# Patient Record
Sex: MALE | Race: WHITE | NOT HISPANIC OR LATINO | Employment: FULL TIME | URBAN - METROPOLITAN AREA
[De-identification: names, ages, dates, MRNs, and addresses within clinical notes are randomized per-mention and may not be internally consistent; named-entity substitution may affect disease eponyms.]

---

## 2023-09-20 ENCOUNTER — APPOINTMENT (OUTPATIENT)
Dept: PRIMARY CARE | Facility: CLINIC | Age: 78
End: 2023-09-20
Payer: COMMERCIAL

## 2023-11-16 DIAGNOSIS — K21.9 GASTRO-ESOPHAGEAL REFLUX DISEASE WITHOUT ESOPHAGITIS: ICD-10-CM

## 2023-11-16 PROBLEM — E78.2 COMBINED HYPERLIPIDEMIA: Status: ACTIVE | Noted: 2023-11-16

## 2023-11-16 PROBLEM — R93.1 ELEVATED CORONARY ARTERY CALCIUM SCORE: Status: ACTIVE | Noted: 2023-11-16

## 2023-11-16 PROBLEM — R07.89 CHEST WALL PAIN, CHRONIC: Status: ACTIVE | Noted: 2023-11-16

## 2023-11-16 PROBLEM — T73.0XXA HUNGER PAIN: Status: ACTIVE | Noted: 2023-11-16

## 2023-11-16 PROBLEM — D18.01 HEMANGIOMA OF SKIN AND SUBCUTANEOUS TISSUE: Status: ACTIVE | Noted: 2019-01-23

## 2023-11-16 PROBLEM — R10.13 DYSPEPSIA: Status: RESOLVED | Noted: 2023-11-16 | Resolved: 2023-11-16

## 2023-11-16 PROBLEM — N52.9 ERECTILE DYSFUNCTION: Status: ACTIVE | Noted: 2023-11-16

## 2023-11-16 PROBLEM — M25.551 HIP PAIN, RIGHT: Status: ACTIVE | Noted: 2023-11-16

## 2023-11-16 PROBLEM — K31.89 SUBMUCOSAL LESION OF STOMACH: Status: ACTIVE | Noted: 2023-11-16

## 2023-11-16 PROBLEM — L25.9 CONTACT DERMATITIS: Status: RESOLVED | Noted: 2023-11-16 | Resolved: 2023-11-16

## 2023-11-16 PROBLEM — B96.89 ACUTE BACTERIAL SINUSITIS: Status: RESOLVED | Noted: 2023-11-16 | Resolved: 2023-11-16

## 2023-11-16 PROBLEM — N40.0 BPH (BENIGN PROSTATIC HYPERPLASIA): Status: ACTIVE | Noted: 2023-11-16

## 2023-11-16 PROBLEM — R22.42 MASS OF LEFT LOWER EXTREMITY: Status: ACTIVE | Noted: 2023-11-16

## 2023-11-16 PROBLEM — R10.9 ABDOMINAL PAIN: Status: RESOLVED | Noted: 2023-11-16 | Resolved: 2023-11-16

## 2023-11-16 PROBLEM — L98.9 SKIN LESION OF SCALP: Status: ACTIVE | Noted: 2023-11-16

## 2023-11-16 PROBLEM — K44.9 HIATAL HERNIA: Status: ACTIVE | Noted: 2023-11-16

## 2023-11-16 PROBLEM — N31.9 NEUROGENIC BLADDER: Status: ACTIVE | Noted: 2023-11-16

## 2023-11-16 PROBLEM — L57.0 ACTINIC KERATOSIS: Status: ACTIVE | Noted: 2019-01-23

## 2023-11-16 PROBLEM — G89.29 CHEST WALL PAIN, CHRONIC: Status: ACTIVE | Noted: 2023-11-16

## 2023-11-16 PROBLEM — N39.0 RECURRENT UTI: Status: ACTIVE | Noted: 2023-11-16

## 2023-11-16 PROBLEM — I25.10 ATHEROSCLEROTIC HEART DISEASE OF NATIVE CORONARY ARTERY WITHOUT ANGINA PECTORIS: Status: ACTIVE | Noted: 2023-11-16

## 2023-11-16 PROBLEM — K14.6 TONGUE PAIN: Status: RESOLVED | Noted: 2023-11-16 | Resolved: 2023-11-16

## 2023-11-16 PROBLEM — J30.2 SEASONAL ALLERGIC RHINITIS: Status: ACTIVE | Noted: 2022-09-20

## 2023-11-16 PROBLEM — J01.90 ACUTE BACTERIAL SINUSITIS: Status: RESOLVED | Noted: 2023-11-16 | Resolved: 2023-11-16

## 2023-11-16 PROBLEM — I65.29 CAROTID ARTERY STENOSIS: Status: ACTIVE | Noted: 2023-11-16

## 2023-11-16 RX ORDER — PIMECROLIMUS 10 MG/G
CREAM TOPICAL
COMMUNITY
Start: 2022-12-15

## 2023-11-16 RX ORDER — ASPIRIN 81 MG/1
1 TABLET ORAL DAILY
COMMUNITY
Start: 2019-10-17

## 2023-11-16 RX ORDER — OMEPRAZOLE 40 MG/1
40 CAPSULE, DELAYED RELEASE ORAL DAILY
Qty: 90 CAPSULE | Refills: 3 | Status: SHIPPED | OUTPATIENT
Start: 2023-11-16

## 2023-11-16 RX ORDER — NAPROXEN 500 MG/1
TABLET ORAL
COMMUNITY
Start: 2023-09-15

## 2023-11-16 RX ORDER — OMEPRAZOLE 40 MG/1
40 CAPSULE, DELAYED RELEASE ORAL DAILY
COMMUNITY

## 2023-11-16 RX ORDER — AZELASTINE 1 MG/ML
SPRAY, METERED NASAL
COMMUNITY

## 2023-11-16 RX ORDER — PREDNISOLONE ACETATE 10 MG/ML
SUSPENSION/ DROPS OPHTHALMIC
COMMUNITY
Start: 2023-08-26

## 2023-11-16 RX ORDER — ATORVASTATIN CALCIUM 40 MG/1
TABLET, FILM COATED ORAL
COMMUNITY

## 2023-11-16 RX ORDER — FLUTICASONE PROPIONATE 50 MCG
SPRAY, SUSPENSION (ML) NASAL
COMMUNITY

## 2024-10-26 ENCOUNTER — OFFICE VISIT (OUTPATIENT)
Dept: URGENT CARE | Age: 79
End: 2024-10-26
Payer: COMMERCIAL

## 2024-10-26 VITALS
OXYGEN SATURATION: 98 % | DIASTOLIC BLOOD PRESSURE: 85 MMHG | TEMPERATURE: 97.5 F | SYSTOLIC BLOOD PRESSURE: 139 MMHG | HEART RATE: 86 BPM | RESPIRATION RATE: 16 BRPM

## 2024-10-26 DIAGNOSIS — R07.89 CHEST DISCOMFORT: Primary | ICD-10-CM

## 2024-10-26 ASSESSMENT — ENCOUNTER SYMPTOMS
EYES NEGATIVE: 1
PALPITATIONS: 1
MUSCULOSKELETAL NEGATIVE: 1
GASTROINTESTINAL NEGATIVE: 1
RESPIRATORY NEGATIVE: 1
ACTIVITY CHANGE: 1

## 2024-10-26 NOTE — PROGRESS NOTES
"Subjective   Patient ID: Fadi North is a 79 y.o. male. They present today with a chief complaint of Chest Pain.    History of Present Illness  A 79-year-old male with past medical history of GERD, hyperlipidemia arrives to clinic with chief complaint of chest palpitations.  The patient reports that he has a history of \"ectopic heartbeats\" that has been ongoing for the last 15 years.  He states that they would come and go.  He reports that this time, he has had irregular heartbeat/patterns over the last 2 weeks that has not gone away.  He has not changed any ADLs, environmental stressors, or food intolerance he reports no changes in his ADLs, environmental stressors, or changes in medications.  He is here at the urgent care today as he does not want to wait in the emergency department.  He is here for further evaluation helping us.  Chest Pain  Associated symptoms: palpitations        Past Medical History  Allergies as of 10/26/2024 - Reviewed 10/26/2024   Allergen Reaction Noted    Penicillins Unknown 07/18/2018       (Not in a hospital admission)       Past Medical History:   Diagnosis Date    Acute bacterial sinusitis 11/16/2023    Acute frontal sinusitis, unspecified 01/08/2018    Acute frontal sinusitis    Acute serous otitis media, bilateral 08/07/2018    Bilateral acute serous otitis media, recurrence not specified    Acute upper respiratory infection, unspecified 01/11/2019    Acute upper respiratory infection    Candidiasis of skin and nail 12/03/2018    Candidal dermatitis    Contact dermatitis 11/16/2023    Dermatitis, 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    Dermatitis, eczematoid    Disorder of the skin and subcutaneous tissue, unspecified 12/31/2018    Skin lesion    Fracture of unspecified phalanx of unspecified finger, initial encounter for closed fracture 03/26/2013    Closed fracture of one or more phalanges of hand    Gastro-esophageal reflux disease without esophagitis 06/30/2013    Esophageal reflux    " 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    Glossodynia    Headache, 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    Headache    Headache, unspecified 01/15/2018    Frontal headache    Malignant neoplasm of tongue, 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    Nonspecific lymphadenitis, 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    Lymphadenitis    Other conditions influencing health status 03/26/2013    Foot pain, unspecified laterality    Other conditions influencing health status 01/11/2019    History of cough    Other conditions influencing health status 06/30/2013    Anomalies Of The Tongue    Other diseases of stomach and duodenum 04/15/2021    Submucosal lesion of stomach    Other specified hypothyroidism 03/26/2013    Secondary hypothyroidism    Pain in throat 06/02/2016    Throat pain    Personal history of diseases of the skin and subcutaneous tissue 06/11/2018    History of actinic keratosis    Personal history of diseases of the skin and subcutaneous tissue 12/17/2018    History of seborrheic keratosis    Personal history of other diseases of the respiratory system 08/07/2018    History of sore throat    Personal history of other specified conditions 06/11/2018    History of polyuria    Primary osteoarthritis, unspecified hand 03/26/2013    Osteoarthritis, hand    Tongue pain 11/16/2023    Urinary tract infection, site not specified 03/26/2013    Urinary tract infection       Past Surgical History:   Procedure Laterality Date    BLADDER SURGERY  09/03/2015    Bladder Surgery    OTHER SURGICAL HISTORY  09/03/2015    Biopsy Tongue            Review of Systems  Review of Systems   Constitutional:  Positive for activity change.   HENT: Negative.     Eyes: Negative.    Respiratory: Negative.     Cardiovascular:  Positive for chest pain and palpitations.   Gastrointestinal: Negative.    Genitourinary: Negative.    Musculoskeletal: Negative.      Objective    There were no vitals filed for this visit.  No LMP for male patient.    Physical Exam  Vitals and  nursing note reviewed.   Constitutional:       Appearance: Normal appearance.   HENT:      Head: Normocephalic and atraumatic.      Right Ear: Tympanic membrane normal.      Left Ear: Tympanic membrane normal.      Nose: Nose normal.      Mouth/Throat:      Mouth: Mucous membranes are moist.      Pharynx: Oropharynx is clear.   Eyes:      Extraocular Movements: Extraocular movements intact.      Conjunctiva/sclera: Conjunctivae normal.      Pupils: Pupils are equal, round, and reactive to light.   Cardiovascular:      Rate and Rhythm: Regular rhythm. Tachycardia present.   Pulmonary:      Effort: Pulmonary effort is normal.      Breath sounds: Normal breath sounds.   Abdominal:      General: Bowel sounds are normal.      Palpations: Abdomen is soft.   Musculoskeletal:         General: Normal range of motion.      Cervical back: Normal range of motion and neck supple.   Skin:     General: Skin is warm.      Capillary Refill: Capillary refill takes less than 2 seconds.   Neurological:      General: No focal deficit present.      Mental Status: He is alert and oriented to person, place, and time. Mental status is at baseline.   Psychiatric:         Mood and Affect: Mood normal.         Behavior: Behavior normal.         Thought Content: Thought content normal.         Judgment: Judgment normal.         Procedures    Point of Care Test & Imaging Results from this visit  No results found for this visit on 10/26/24.   No results found.    Diagnostic study results (if any) were reviewed by YAMILEX Goldberg.    Assessment/Plan   Allergies, medications, history, and pertinent labs/EKGs/Imaging reviewed by YAMILEX Goldberg.     Medical Decision Making  Upon initial assessment, the patient was sitting at the bedside chair in mild distress.  The patient does seem winded at this time.  Patient denies any chest pain however does feel an irregular heart palpitations    EKG:Reveals sinus rhythm with minor  inferior repolarization disturbances, consider ischemia, LV overload or a specific change.  Heart rate is 87 bpm no ST elevation or ectopy noted    Previous EKG completed in the year of 2019 revealed normal sinus rhythm. Echocardiogram/stress test revealed normal EKG, with no evidence of significant ischemia or scar.  Normal LV function, with normal imaging.  This was done the same year.    Given the patient's age, comorbidities, and symptoms, recommend immediate evaluation at the patient reports that he will drive to the closest emergency department for further evaluation and health maintenance.    This document was generated using the assistance of voice recognition software. If there are any errors of spelling, grammar, syntax, or meaning; please feel free to contact me directly for clarification.     Orders and Diagnoses  Diagnoses and all orders for this visit:  Chest discomfort  -     ECG 12 Lead      Medical Admin Record      Patient disposition: ED    Electronically signed by YAMILEX Goldberg  3:42 PM

## 2024-10-31 ENCOUNTER — OFFICE VISIT (OUTPATIENT)
Dept: PRIMARY CARE | Facility: CLINIC | Age: 79
End: 2024-10-31
Payer: COMMERCIAL

## 2024-10-31 VITALS
SYSTOLIC BLOOD PRESSURE: 138 MMHG | DIASTOLIC BLOOD PRESSURE: 79 MMHG | WEIGHT: 166.2 LBS | OXYGEN SATURATION: 95 % | HEART RATE: 84 BPM | TEMPERATURE: 97.3 F | BODY MASS INDEX: 27.66 KG/M2

## 2024-10-31 DIAGNOSIS — R00.2 HEART PALPITATIONS: Primary | ICD-10-CM

## 2024-10-31 DIAGNOSIS — I65.23 BILATERAL CAROTID ARTERY STENOSIS: ICD-10-CM

## 2024-10-31 PROBLEM — R07.89 CHEST WALL PAIN, CHRONIC: Status: RESOLVED | Noted: 2023-11-16 | Resolved: 2024-10-31

## 2024-10-31 PROBLEM — Z96.641 STATUS POST TOTAL HIP REPLACEMENT, RIGHT: Status: ACTIVE | Noted: 2021-08-24

## 2024-10-31 PROBLEM — M16.12 PRIMARY OSTEOARTHRITIS OF LEFT HIP: Status: ACTIVE | Noted: 2022-09-14

## 2024-10-31 PROBLEM — M25.551 HIP PAIN, RIGHT: Status: RESOLVED | Noted: 2023-11-16 | Resolved: 2024-10-31

## 2024-10-31 PROBLEM — G89.29 CHEST WALL PAIN, CHRONIC: Status: RESOLVED | Noted: 2023-11-16 | Resolved: 2024-10-31

## 2024-10-31 PROBLEM — L98.9 SKIN LESION OF SCALP: Status: RESOLVED | Noted: 2023-11-16 | Resolved: 2024-10-31

## 2024-10-31 PROBLEM — R22.42 MASS OF LEFT LOWER EXTREMITY: Status: RESOLVED | Noted: 2023-11-16 | Resolved: 2024-10-31

## 2024-10-31 PROBLEM — T73.0XXA HUNGER PAIN: Status: RESOLVED | Noted: 2023-11-16 | Resolved: 2024-10-31

## 2024-10-31 PROBLEM — M16.11 PRIMARY OSTEOARTHRITIS OF RIGHT HIP: Status: ACTIVE | Noted: 2020-01-03

## 2024-10-31 PROCEDURE — 1160F RVW MEDS BY RX/DR IN RCRD: CPT | Performed by: STUDENT IN AN ORGANIZED HEALTH CARE EDUCATION/TRAINING PROGRAM

## 2024-10-31 PROCEDURE — 1036F TOBACCO NON-USER: CPT | Performed by: STUDENT IN AN ORGANIZED HEALTH CARE EDUCATION/TRAINING PROGRAM

## 2024-10-31 PROCEDURE — 99214 OFFICE O/P EST MOD 30 MIN: CPT | Performed by: STUDENT IN AN ORGANIZED HEALTH CARE EDUCATION/TRAINING PROGRAM

## 2024-10-31 PROCEDURE — 1159F MED LIST DOCD IN RCRD: CPT | Performed by: STUDENT IN AN ORGANIZED HEALTH CARE EDUCATION/TRAINING PROGRAM

## 2024-10-31 RX ORDER — METOPROLOL SUCCINATE 25 MG/1
25 TABLET, EXTENDED RELEASE ORAL
COMMUNITY
Start: 2024-10-26 | End: 2024-10-31 | Stop reason: ALTCHOICE

## 2024-10-31 ASSESSMENT — ENCOUNTER SYMPTOMS
PALPITATIONS: 1
COUGH: 0
RHINORRHEA: 0
FEVER: 0
FATIGUE: 0
CHILLS: 0
LIGHT-HEADEDNESS: 0
SHORTNESS OF BREATH: 1
DIZZINESS: 0
HEADACHES: 0

## 2024-11-18 ENCOUNTER — HOSPITAL ENCOUNTER (OUTPATIENT)
Dept: VASCULAR MEDICINE | Facility: CLINIC | Age: 79
Discharge: HOME | End: 2024-11-18
Payer: COMMERCIAL

## 2024-11-18 DIAGNOSIS — I65.23 BILATERAL CAROTID ARTERY STENOSIS: ICD-10-CM

## 2024-11-18 DIAGNOSIS — R09.89 OTHER SPECIFIED SYMPTOMS AND SIGNS INVOLVING THE CIRCULATORY AND RESPIRATORY SYSTEMS: ICD-10-CM

## 2024-11-18 PROCEDURE — 93880 EXTRACRANIAL BILAT STUDY: CPT | Performed by: STUDENT IN AN ORGANIZED HEALTH CARE EDUCATION/TRAINING PROGRAM

## 2024-11-18 PROCEDURE — 93880 EXTRACRANIAL BILAT STUDY: CPT

## 2024-11-20 ENCOUNTER — OFFICE VISIT (OUTPATIENT)
Dept: CARDIOLOGY | Facility: CLINIC | Age: 79
End: 2024-11-20
Payer: COMMERCIAL

## 2024-11-20 ENCOUNTER — HOSPITAL ENCOUNTER (OUTPATIENT)
Dept: CARDIOLOGY | Facility: CLINIC | Age: 79
Discharge: HOME | End: 2024-11-20
Payer: COMMERCIAL

## 2024-11-20 VITALS
WEIGHT: 167 LBS | HEART RATE: 71 BPM | HEIGHT: 66 IN | BODY MASS INDEX: 26.84 KG/M2 | DIASTOLIC BLOOD PRESSURE: 90 MMHG | SYSTOLIC BLOOD PRESSURE: 167 MMHG | TEMPERATURE: 97.3 F

## 2024-11-20 DIAGNOSIS — R07.89 CHEST DISCOMFORT: ICD-10-CM

## 2024-11-20 DIAGNOSIS — I65.21 MORE THAN 50 PERCENT STENOSIS OF RIGHT INTERNAL CAROTID ARTERY: Primary | ICD-10-CM

## 2024-11-20 DIAGNOSIS — E78.00 HYPERCHOLESTEREMIA: ICD-10-CM

## 2024-11-20 DIAGNOSIS — I65.21 MORE THAN 50 PERCENT STENOSIS OF RIGHT INTERNAL CAROTID ARTERY: ICD-10-CM

## 2024-11-20 DIAGNOSIS — I25.10 ATHEROSCLEROSIS OF NATIVE CORONARY ARTERY OF NATIVE HEART WITHOUT ANGINA PECTORIS: ICD-10-CM

## 2024-11-20 DIAGNOSIS — R00.2 HEART PALPITATIONS: ICD-10-CM

## 2024-11-20 DIAGNOSIS — R93.1 ELEVATED CORONARY ARTERY CALCIUM SCORE: ICD-10-CM

## 2024-11-20 DIAGNOSIS — R00.2 PALPITATIONS: ICD-10-CM

## 2024-11-20 LAB — BODY SURFACE AREA: 1.88 M2

## 2024-11-20 PROCEDURE — 1160F RVW MEDS BY RX/DR IN RCRD: CPT | Performed by: INTERNAL MEDICINE

## 2024-11-20 PROCEDURE — 1159F MED LIST DOCD IN RCRD: CPT | Performed by: INTERNAL MEDICINE

## 2024-11-20 PROCEDURE — 93242 EXT ECG>48HR<7D RECORDING: CPT

## 2024-11-20 PROCEDURE — 99215 OFFICE O/P EST HI 40 MIN: CPT | Performed by: INTERNAL MEDICINE

## 2024-11-20 PROCEDURE — 1036F TOBACCO NON-USER: CPT | Performed by: INTERNAL MEDICINE

## 2024-11-20 PROCEDURE — 99205 OFFICE O/P NEW HI 60 MIN: CPT | Performed by: INTERNAL MEDICINE

## 2024-11-20 RX ORDER — ASPIRIN 81 MG/1
81 TABLET ORAL DAILY
COMMUNITY

## 2024-11-20 RX ORDER — ROSUVASTATIN CALCIUM 20 MG/1
20 TABLET, COATED ORAL DAILY
Qty: 30 TABLET | Refills: 11 | Status: SHIPPED | OUTPATIENT
Start: 2024-11-20 | End: 2025-11-20

## 2024-11-20 NOTE — PROGRESS NOTES
Chief Complaint:   Coronary Artery Disease     History of Present Illness     Fadi North is a 79 y.o. male presenting with for evaluation of preclinical coronary artery disease detected by coronary artery calcium scoring (JHZ=335 in 2019).   The patient is tolerating guideline-directed medical therapy with antiplatelet and statin medication and is compliant.  The patient exercises regularly and follows a heart healthy diet.  The patient has been well since their last office appointment and is not having any anginal symptoms or dyspnea on exertion.  Chronic palpitations due to ectopic beats x decades more frequent and longer duration x 1 month. Seen in ER 10/26/24 - PVC's.  No syncope.  No neurologic Sx's.    Review of Systems  All pertinent systems have been reviewed and are negative except for what is stated in the history of present illness.    All other systems have been reviewed and are negative and noncontributory to this patient's current ailments.  .       Previous History     Past Medical History:  He has a past medical history of Acute bacterial sinusitis (11/16/2023), Acute frontal sinusitis, unspecified (01/08/2018), Acute serous otitis media, bilateral (08/07/2018), Acute upper respiratory infection, unspecified (01/11/2019), Candidiasis of skin and nail (12/03/2018), Chest discomfort (11/20/2024), Contact dermatitis (11/16/2023), Dermatitis, unspecified (03/26/2013), Disorder of the skin and subcutaneous tissue, unspecified (12/31/2018), Fracture of unspecified phalanx of unspecified finger, initial encounter for closed fracture (03/26/2013), Gastro-esophageal reflux disease without esophagitis (06/30/2013), Glossodynia (06/30/2013), Headache, unspecified (03/26/2013), Headache, unspecified (01/15/2018), Hypercholesteremia (11/20/2024), Malignant neoplasm of tongue, unspecified (03/26/2013), More than 50 percent stenosis of right internal carotid artery (11/20/2024), Nonspecific lymphadenitis,  unspecified (03/26/2013), Other conditions influencing health status (03/26/2013), Other conditions influencing health status (01/11/2019), Other conditions influencing health status (06/30/2013), Other diseases of stomach and duodenum (04/15/2021), Other specified hypothyroidism (03/26/2013), Pain in throat (06/02/2016), Palpitations (11/20/2024), Personal history of diseases of the skin and subcutaneous tissue (06/11/2018), Personal history of diseases of the skin and subcutaneous tissue (12/17/2018), Personal history of other diseases of the respiratory system (08/07/2018), Personal history of other specified conditions (06/11/2018), Primary osteoarthritis, unspecified hand (03/26/2013), Tongue pain (11/16/2023), and Urinary tract infection, site not specified (03/26/2013).    Past Surgical History:  He has a past surgical history that includes Other surgical history (09/03/2015); Bladder surgery (09/03/2015); and Total hip arthroplasty.      Social History:  He reports that he has never smoked. He has never been exposed to tobacco smoke. He has never used smokeless tobacco. No history on file for alcohol use and drug use.    Family History:  Family History   Problem Relation Name Age of Onset    No Known Problems Mother      Colon cancer Father      Other (COLORECTAL CANCER) Father      Breast cancer Sister      Genetic Disease Carrier Sister      Other (MONOALLELIC MUTATION OF DENY GENE) Sister      Breast cancer Father's Sister          Allergies:  Penicillins    Outpatient Medications:  Current Outpatient Medications   Medication Instructions    azelastine (Astelin) 137 mcg (0.1 %) nasal spray SPRAY 2 SPRAYS BY INTRANASAL ROUTE TWICE A DAY    fluticasone (Flonase) 50 mcg/actuation nasal spray INSTILL 2 SPRAYS BY INTRANASAL ROUTE EVERY DAY    omeprazole (PRILOSEC) 40 mg, Daily       Physical Examination   Vitals:  Visit Vitals  /90 (BP Location: Right arm)   Pulse 71   Temp 36.3 °C (97.3 °F)   Ht 1.676 m  "(5' 6\")   Wt 75.8 kg (167 lb)   BMI 26.95 kg/m²   Smoking Status Never   BSA 1.88 m²    Physical Exam  Vitals reviewed.   Constitutional:       General: He is not in acute distress.     Appearance: Normal appearance.   HENT:      Head: Normocephalic and atraumatic.      Nose: Nose normal.   Eyes:      Conjunctiva/sclera: Conjunctivae normal.   Cardiovascular:      Rate and Rhythm: Normal rate and regular rhythm.      Pulses: Normal pulses.      Heart sounds: No murmur heard.  Pulmonary:      Effort: Pulmonary effort is normal. No respiratory distress.      Breath sounds: Normal breath sounds. No wheezing, rhonchi or rales.   Abdominal:      General: Bowel sounds are normal. There is no distension.      Palpations: Abdomen is soft.      Tenderness: There is no abdominal tenderness.   Musculoskeletal:         General: No swelling.      Right lower leg: No edema.      Left lower leg: No edema.   Skin:     General: Skin is warm and dry.      Capillary Refill: Capillary refill takes less than 2 seconds.   Neurological:      General: No focal deficit present.      Mental Status: He is alert.   Psychiatric:         Mood and Affect: Mood normal.             Labs/Imaging/Cardiac Studies     Last Labs:  CBC -  No results found for: \"WBC\", \"HGB\", \"HCT\", \"MCV\", \"PLT\"    CMP -  Lab Results   Component Value Date    CALCIUM 10.2 06/11/2018    PHOS 3.7 06/11/2018    PROT 7.4 06/11/2018    ALBUMIN 4.6 06/11/2018    AST 18 06/11/2018    ALT 17 06/11/2018    ALKPHOS 77 06/11/2018    BILITOT 0.7 06/11/2018       LIPID PANEL -   Lab Results   Component Value Date    CHOL 241 (H) 10/17/2019    HDL 53.9 10/17/2019    CHHDL 4.5 10/17/2019    VLDL 20 10/17/2019    TRIG 100 10/17/2019       RENAL FUNCTION PANEL -   Lab Results   Component Value Date    K 4.7 06/11/2018    PHOS 3.7 06/11/2018       Lab Results   Component Value Date    HGBA1C 5.1 05/02/2022       Reviewed ECG  Reviewed Labs  Reviewed Carotid ultrasound  Reviewed Urgent care " records and ED records  Complex MDM/High risk condition severe carotid stenosis  Urgent referral    Echo:  No echocardiogram results found for the past 12 months       Assessment and Recommendations     Assessment/Plan     1. Heart palpitations  Long Hx ectopic beats.  Holter x 7 days. Rule out AF.  - Referral to Cardiology    2. More than 50 percent stenosis of right internal carotid artery (Primary)  Start ASA and Crestor and referral to vascular surgery. ASX.    3. Hypercholesteremia  Star Crestor goal LDL<70    4. Elevated coronary artery calcium score    5. Atherosclerosis of native coronary artery of native heart without angina pectoris  The patient's CAD, as detailed in the HPI, has been clinically stable, without any anginal symptoms or dyspnea.  The patient will start treatment with guideline-directed medical therapy with antiplatelet (ASA) and statin medications and was advised regular exercise and a heart healthy diet.               Jose Vang MD    Exclusive of any other services or procedures performed, I, Jose Vang MD , spent 30 minutes in duration for this visit today.  This time consisted of chart review, obtaining history, and/or performing the exam as documented above as well as documenting the clinical information for the encounter in the electronic record, discussing treatment options, plans, and/or goals with patient, family, and/or caregiver, refilling medications, updating the electronic record, ordering medicines, lab work, imaging, referrals, and/or procedures as documented above and communicating with other [facility] professionals. I have discussed the results of laboratory, radiology, and cardiology studies with the patient and their family/caregiver.

## 2024-11-25 ENCOUNTER — TELEPHONE (OUTPATIENT)
Dept: PRIMARY CARE | Facility: CLINIC | Age: 79
End: 2024-11-25
Payer: COMMERCIAL

## 2024-11-25 DIAGNOSIS — R32 URINARY INCONTINENCE, UNSPECIFIED TYPE: Primary | ICD-10-CM

## 2024-11-27 NOTE — TELEPHONE ENCOUNTER
Has had a urologist for past 16 years. Has bladder atrophy and has to be cath 4 to 5 times a day. Looking for a new Urologist that might be more helpful. Has recurrent UTI's because of catheter.

## 2024-12-10 ENCOUNTER — TELEPHONE (OUTPATIENT)
Dept: CARDIOLOGY | Facility: CLINIC | Age: 79
End: 2024-12-10

## 2024-12-10 LAB — BODY SURFACE AREA: 1.88 M2

## 2024-12-10 NOTE — TELEPHONE ENCOUNTER
----- Message from Jose Vang sent at 12/10/2024  2:25 PM EST -----  Holter unremarkable - rare brief SVT and rare PAC's.  ----- Message -----  From: Jose Vang MD  Sent: 12/10/2024  12:42 PM EST  To: Jose Vang MD

## 2024-12-18 ENCOUNTER — TELEPHONE (OUTPATIENT)
Dept: PRIMARY CARE | Facility: CLINIC | Age: 79
End: 2024-12-18
Payer: COMMERCIAL

## 2024-12-18 DIAGNOSIS — K21.9 GASTROESOPHAGEAL REFLUX DISEASE WITHOUT ESOPHAGITIS: ICD-10-CM

## 2024-12-18 RX ORDER — OMEPRAZOLE 40 MG/1
40 CAPSULE, DELAYED RELEASE ORAL DAILY
Qty: 90 CAPSULE | Refills: 1 | Status: SHIPPED | OUTPATIENT
Start: 2024-12-18 | End: 2025-06-16

## 2024-12-18 NOTE — TELEPHONE ENCOUNTER
Patient called, LANDON, requesting refill of Omeprazole be sent to [facility] in [place].     LOV - 10/31/24

## 2024-12-23 ENCOUNTER — OFFICE VISIT (OUTPATIENT)
Dept: VASCULAR SURGERY | Facility: HOSPITAL | Age: 79
End: 2024-12-23
Payer: COMMERCIAL

## 2024-12-23 VITALS
BODY MASS INDEX: 27.16 KG/M2 | HEIGHT: 66 IN | HEART RATE: 74 BPM | DIASTOLIC BLOOD PRESSURE: 90 MMHG | WEIGHT: 169 LBS | OXYGEN SATURATION: 98 % | SYSTOLIC BLOOD PRESSURE: 157 MMHG

## 2024-12-23 DIAGNOSIS — E78.00 HYPERCHOLESTEREMIA: ICD-10-CM

## 2024-12-23 DIAGNOSIS — I65.21 MORE THAN 50 PERCENT STENOSIS OF RIGHT INTERNAL CAROTID ARTERY: ICD-10-CM

## 2024-12-23 DIAGNOSIS — I25.10 ATHEROSCLEROSIS OF NATIVE CORONARY ARTERY OF NATIVE HEART WITHOUT ANGINA PECTORIS: ICD-10-CM

## 2024-12-23 DIAGNOSIS — I65.23 CAROTID STENOSIS, ASYMPTOMATIC, BILATERAL: Primary | ICD-10-CM

## 2024-12-23 DIAGNOSIS — R93.1 ELEVATED CORONARY ARTERY CALCIUM SCORE: ICD-10-CM

## 2024-12-23 DIAGNOSIS — R00.2 PALPITATIONS: ICD-10-CM

## 2024-12-23 DIAGNOSIS — R00.2 HEART PALPITATIONS: ICD-10-CM

## 2024-12-23 DIAGNOSIS — R07.89 CHEST DISCOMFORT: ICD-10-CM

## 2024-12-23 PROCEDURE — 99204 OFFICE O/P NEW MOD 45 MIN: CPT | Performed by: SURGERY

## 2024-12-23 PROCEDURE — 99214 OFFICE O/P EST MOD 30 MIN: CPT | Performed by: SURGERY

## 2024-12-23 ASSESSMENT — ENCOUNTER SYMPTOMS
BACK PAIN: 0
WOUND: 0
ABDOMINAL PAIN: 0
DIZZINESS: 0
APNEA: 0

## 2024-12-23 NOTE — PROGRESS NOTES
Vascular Surgery Consult/Clinic Note    CC: Carotid stenosis.     HPI:  Fadi North is 79 y.o. male with history of CAD who is here for for Asx. Carotid stenosis.   He denies any history of stroke, TIA or unilateral neurologic symptoms.       Meds:   Current Outpatient Medications on File Prior to Visit   Medication Sig Dispense Refill    aspirin 81 mg EC tablet Take 1 tablet (81 mg) by mouth once daily.      azelastine (Astelin) 137 mcg (0.1 %) nasal spray SPRAY 2 SPRAYS BY INTRANASAL ROUTE TWICE A DAY      fluticasone (Flonase) 50 mcg/actuation nasal spray INSTILL 2 SPRAYS BY INTRANASAL ROUTE EVERY DAY      omeprazole (PriLOSEC) 40 mg DR capsule Take 1 capsule (40 mg) by mouth once daily. 90 capsule 1    rosuvastatin (Crestor) 20 mg tablet Take 1 tablet (20 mg) by mouth once daily. 30 tablet 11    [DISCONTINUED] omeprazole (PriLOSEC) 40 mg DR capsule Take 1 capsule (40 mg) by mouth once daily.       No current facility-administered medications on file prior to visit.        Allergies:   Allergies   Allergen Reactions    Penicillins Unknown       SH:    Social Drivers of Health     Tobacco Use: Low Risk  (11/20/2024)    Patient History     Smoking Tobacco Use: Never     Smokeless Tobacco Use: Never     Passive Exposure: Never   Alcohol Use: Not on file   Financial Resource Strain: Not on file   Food Insecurity: Not on file   Transportation Needs: Not on file   Physical Activity: Not on file   Stress: Not on file   Social Connections: Not on file   Intimate Partner Violence: Not on file   Depression: Not on file   Housing Stability: Not on file   Utilities: Not on file   Digital Equity: Not on file   Health Literacy: Not on file        FH:  Family History   Problem Relation Name Age of Onset    No Known Problems Mother      Colon cancer Father      Other (COLORECTAL CANCER) Father      Breast cancer Sister      Genetic Disease Carrier Sister      Other (MONOALLELIC MUTATION OF DENY GENE) Sister      Breast  cancer Father's Sister          ROS:  Review of Systems   HENT:  Negative for congestion.    Respiratory:  Negative for apnea.    Cardiovascular:  Negative for chest pain.   Gastrointestinal:  Negative for abdominal pain.   Musculoskeletal:  Negative for back pain.   Skin:  Negative for wound.   Neurological:  Negative for dizziness.        Objective:  Vitals:  Vitals:    12/23/24 0813   BP: 157/90   Pulse:    SpO2:         Exam:  Gen: in NAD  GI: Soft, ND/NT  Ext:  BUE/BLE with 5/5 motor str.   CN II-XII grossly intact.     Imaging:  Carotid artery duplex (11/18/2024) independently reviewed.   R ICA with >70% stenosis.   L ICA patent without flow limiting stenosis.       Assessment & Plan:  Fadi North is 79 y.o. male with Asx. R ICA >70% stenosis.    - Cont. ASA and statin therapy.    - Follow up in 2-4 weeks with CTA head/neck.       Burton Beltran MD, PhD  Clinical   [facility] [employer]  Co-Director, Aortic Center  [facility] [place]

## 2025-01-03 ENCOUNTER — LAB (OUTPATIENT)
Dept: LAB | Facility: LAB | Age: 80
End: 2025-01-03
Payer: COMMERCIAL

## 2025-01-03 DIAGNOSIS — I65.23 CAROTID STENOSIS, ASYMPTOMATIC, BILATERAL: ICD-10-CM

## 2025-01-03 DIAGNOSIS — I65.23 BILATERAL CAROTID ARTERY STENOSIS: ICD-10-CM

## 2025-01-03 DIAGNOSIS — R00.2 HEART PALPITATIONS: ICD-10-CM

## 2025-01-03 LAB
ALBUMIN SERPL BCP-MCNC: 4.3 G/DL (ref 3.4–5)
ALP SERPL-CCNC: 66 U/L (ref 33–136)
ALT SERPL W P-5'-P-CCNC: 13 U/L (ref 10–52)
ANION GAP SERPL CALC-SCNC: 9 MMOL/L (ref 10–20)
AST SERPL W P-5'-P-CCNC: 17 U/L (ref 9–39)
BILIRUB SERPL-MCNC: 0.7 MG/DL (ref 0–1.2)
BUN SERPL-MCNC: 19 MG/DL (ref 6–23)
CALCIUM SERPL-MCNC: 9.6 MG/DL (ref 8.6–10.3)
CHLORIDE SERPL-SCNC: 106 MMOL/L (ref 98–107)
CHOLEST SERPL-MCNC: 260 MG/DL (ref 0–199)
CHOLESTEROL/HDL RATIO: 4.3
CO2 SERPL-SCNC: 29 MMOL/L (ref 21–32)
CREAT SERPL-MCNC: 1.19 MG/DL (ref 0.5–1.3)
EGFRCR SERPLBLD CKD-EPI 2021: 63 ML/MIN/1.73M*2
ERYTHROCYTE [DISTWIDTH] IN BLOOD BY AUTOMATED COUNT: 13.5 % (ref 11.5–14.5)
EST. AVERAGE GLUCOSE BLD GHB EST-MCNC: 94 MG/DL
GLUCOSE SERPL-MCNC: 99 MG/DL (ref 74–99)
HBA1C MFR BLD: 4.9 %
HCT VFR BLD AUTO: 46.3 % (ref 41–52)
HDLC SERPL-MCNC: 60.7 MG/DL
HGB BLD-MCNC: 15.1 G/DL (ref 13.5–17.5)
LDLC SERPL CALC-MCNC: 168 MG/DL
MCH RBC QN AUTO: 32.7 PG (ref 26–34)
MCHC RBC AUTO-ENTMCNC: 32.6 G/DL (ref 32–36)
MCV RBC AUTO: 100 FL (ref 80–100)
NON HDL CHOLESTEROL: 199 MG/DL (ref 0–149)
NRBC BLD-RTO: 0 /100 WBCS (ref 0–0)
PLATELET # BLD AUTO: 255 X10*3/UL (ref 150–450)
POTASSIUM SERPL-SCNC: 4.2 MMOL/L (ref 3.5–5.3)
PROT SERPL-MCNC: 6.8 G/DL (ref 6.4–8.2)
RBC # BLD AUTO: 4.62 X10*6/UL (ref 4.5–5.9)
SODIUM SERPL-SCNC: 140 MMOL/L (ref 136–145)
TRIGL SERPL-MCNC: 157 MG/DL (ref 0–149)
VLDL: 31 MG/DL (ref 0–40)
WBC # BLD AUTO: 6.8 X10*3/UL (ref 4.4–11.3)

## 2025-01-03 PROCEDURE — 80053 COMPREHEN METABOLIC PANEL: CPT

## 2025-01-03 PROCEDURE — 83036 HEMOGLOBIN GLYCOSYLATED A1C: CPT

## 2025-01-03 PROCEDURE — 80061 LIPID PANEL: CPT

## 2025-01-03 PROCEDURE — 85027 COMPLETE CBC AUTOMATED: CPT

## 2025-01-07 ENCOUNTER — HOSPITAL ENCOUNTER (OUTPATIENT)
Dept: RADIOLOGY | Facility: CLINIC | Age: 80
Discharge: HOME | End: 2025-01-07
Payer: COMMERCIAL

## 2025-01-07 DIAGNOSIS — I65.23 CAROTID STENOSIS, ASYMPTOMATIC, BILATERAL: ICD-10-CM

## 2025-01-07 PROCEDURE — 2550000001 HC RX 255 CONTRASTS: Performed by: SURGERY

## 2025-01-07 PROCEDURE — 70496 CT ANGIOGRAPHY HEAD: CPT | Performed by: RADIOLOGY

## 2025-01-07 PROCEDURE — 70498 CT ANGIOGRAPHY NECK: CPT

## 2025-01-07 PROCEDURE — 70498 CT ANGIOGRAPHY NECK: CPT | Performed by: RADIOLOGY

## 2025-01-07 RX ADMIN — IOHEXOL 79 ML: 350 INJECTION, SOLUTION INTRAVENOUS at 10:07

## 2025-01-15 ENCOUNTER — APPOINTMENT (OUTPATIENT)
Dept: UROLOGY | Facility: HOSPITAL | Age: 80
End: 2025-01-15
Payer: COMMERCIAL

## 2025-01-20 ENCOUNTER — OFFICE VISIT (OUTPATIENT)
Dept: VASCULAR SURGERY | Facility: HOSPITAL | Age: 80
End: 2025-01-20
Payer: COMMERCIAL

## 2025-01-20 VITALS
SYSTOLIC BLOOD PRESSURE: 162 MMHG | BODY MASS INDEX: 26.52 KG/M2 | HEART RATE: 86 BPM | OXYGEN SATURATION: 96 % | HEIGHT: 66 IN | WEIGHT: 165 LBS | DIASTOLIC BLOOD PRESSURE: 93 MMHG

## 2025-01-20 DIAGNOSIS — I65.21 CAROTID STENOSIS, ASYMPTOMATIC, RIGHT: Primary | ICD-10-CM

## 2025-01-20 PROCEDURE — 99214 OFFICE O/P EST MOD 30 MIN: CPT | Mod: 57 | Performed by: SURGERY

## 2025-01-20 PROCEDURE — 99214 OFFICE O/P EST MOD 30 MIN: CPT | Performed by: SURGERY

## 2025-01-20 PROCEDURE — 1159F MED LIST DOCD IN RCRD: CPT | Performed by: SURGERY

## 2025-01-20 ASSESSMENT — ENCOUNTER SYMPTOMS
OCCASIONAL FEELINGS OF UNSTEADINESS: 0
LOSS OF SENSATION IN FEET: 0
DEPRESSION: 0

## 2025-01-20 NOTE — H&P (VIEW-ONLY)
Vascular Surgery Clinic Note    CC: symptomatic carotid artery stenosis    History Of Present Illness:   Fadi North is a 79 y.o. male with asymptomatic carotid artery stenosis. Duplex US from 11/18/24 demonstrated >70% right ICA stenosis, <50% left ICA stenosis. CTA head and neck from 1/7/25 with focal 70-80% right ICA stenosis, 15-20% left carotid stenosis.  Since last seen in clinic on 12/23/24, patient reports no issues. Denies history of stroke/TIA symptoms. Denies any recent hospitalizations or change in medications. He takes his ASA and statin as prescribed.    Medical History:  Patient Active Problem List   Diagnosis    Atherosclerotic heart disease of native coronary artery without angina pectoris    BPH (benign prostatic hyperplasia)    Carotid artery stenosis    Elevated coronary artery calcium score    Erectile dysfunction    Gastroesophageal reflux disease    Hiatal hernia    Submucosal lesion of stomach    Hemangioma of skin and subcutaneous tissue    Combined hyperlipidemia    Neurogenic bladder    Recurrent UTI    Seasonal allergic rhinitis    Actinic keratosis    Primary osteoarthritis of left hip    Primary osteoarthritis of right hip    Status post total hip replacement, right    More than 50 percent stenosis of right internal carotid artery    Hypercholesteremia    Palpitations        SH:    Social Drivers of Health     Tobacco Use: Low Risk  (11/20/2024)    Patient History     Smoking Tobacco Use: Never     Smokeless Tobacco Use: Never     Passive Exposure: Never   Alcohol Use: Not on file   Financial Resource Strain: Not on file   Food Insecurity: Not on file   Transportation Needs: Not on file   Physical Activity: Not on file   Stress: Not on file   Social Connections: Not on file   Intimate Partner Violence: Not on file   Depression: Not on file   Housing Stability: Not on file   Utilities: Not on file   Digital Equity: Not on file   Health Literacy: Not on file        FH:  Family  "History   Problem Relation Name Age of Onset    No Known Problems Mother      Colon cancer Father      Other (COLORECTAL CANCER) Father      Breast cancer Sister      Genetic Disease Carrier Sister      Other (MONOALLELIC MUTATION OF DENY GENE) Sister      Breast cancer Father's Sister          Allergies:   Allergies   Allergen Reactions    Penicillins Unknown       Meds:   Current Outpatient Medications   Medication Instructions    aspirin 81 mg, Daily    azelastine (Astelin) 137 mcg (0.1 %) nasal spray SPRAY 2 SPRAYS BY INTRANASAL ROUTE TWICE A DAY    fluticasone (Flonase) 50 mcg/actuation nasal spray INSTILL 2 SPRAYS BY INTRANASAL ROUTE EVERY DAY    omeprazole (PRILOSEC) 40 mg, oral, Daily    rosuvastatin (CRESTOR) 20 mg, oral, Daily       ROS:  All systems were reviewed and noted to be negative, other than described above.     Objective:  Last Recorded Vitals  Blood pressure (!) 162/93, pulse 86, height 1.676 m (5' 6\"), weight 74.8 kg (165 lb), SpO2 96%.    Exam:  Constitutional: Well appearing, NAD.  CARD: No tachycardia, RRR.  RESP: Unlabored breathing.  ABD: Soft, nontender, non-distended.  SKIN: No lesions.  NEURO: No focal deficits noted. Motor/sensory intact.   EXTREMITIES: Warm & well perfused.   PULSES: Bilateral palpable radial and carotid pulses    Imaging Reviewed:  CTA head and neck 1/7/25 - independently reviewed  IMPRESSION:  CTA head:      Mild focal narrowing involving the P2 segments of the bilateral  posterior cerebral arteries, otherwise no striking narrowing of the  visualized arteries in the head.      CTA neck:      1. There is 75-80% luminal narrowing of the right carotid bulb and  15-20% luminal narrowing of the left carotid bulb, according to  NASCET criteria. Luminal narrowing on the right is primarily due to  noncalcified plaque with some contribution from atherosclerotic  calcifications.      2. Otherwise, no striking narrowing of the visualized arteries in the  neck.      This study " was interpreted at [facility].        Assessment & Plan:  Fadi North is a 79 y.o. male with asymptomatic >70% right carotid artery stenosis. Has recent carotid duplex and CTA head and neck demonstrating 70-80% right ICA stenosis, 15-20% left ICA stenosis. Patient meets criteria for right CEA.    - plan for right CEA, possibly on 1/30/25 pending OR availability  - risks, benefits, and alternative discussed and informed consent obtained in office  - continue ASA and statin therapy    Patient seen and examined with Dr. Ruby Rowe MD  Vascular Surgery PGY6

## 2025-01-20 NOTE — PROGRESS NOTES
Vascular Surgery Clinic Note    CC: symptomatic carotid artery stenosis    History Of Present Illness:   Fadi North is a 79 y.o. male with asymptomatic carotid artery stenosis. Duplex US from 11/18/24 demonstrated >70% right ICA stenosis, <50% left ICA stenosis. CTA head and neck from 1/7/25 with focal 70-80% right ICA stenosis, 15-20% left carotid stenosis.  Since last seen in clinic on 12/23/24, patient reports no issues. Denies history of stroke/TIA symptoms. Denies any recent hospitalizations or change in medications. He takes his ASA and statin as prescribed.    Medical History:  Patient Active Problem List   Diagnosis    Atherosclerotic heart disease of native coronary artery without angina pectoris    BPH (benign prostatic hyperplasia)    Carotid artery stenosis    Elevated coronary artery calcium score    Erectile dysfunction    Gastroesophageal reflux disease    Hiatal hernia    Submucosal lesion of stomach    Hemangioma of skin and subcutaneous tissue    Combined hyperlipidemia    Neurogenic bladder    Recurrent UTI    Seasonal allergic rhinitis    Actinic keratosis    Primary osteoarthritis of left hip    Primary osteoarthritis of right hip    Status post total hip replacement, right    More than 50 percent stenosis of right internal carotid artery    Hypercholesteremia    Palpitations        SH:    Social Drivers of Health     Tobacco Use: Low Risk  (11/20/2024)    Patient History     Smoking Tobacco Use: Never     Smokeless Tobacco Use: Never     Passive Exposure: Never   Alcohol Use: Not on file   Financial Resource Strain: Not on file   Food Insecurity: Not on file   Transportation Needs: Not on file   Physical Activity: Not on file   Stress: Not on file   Social Connections: Not on file   Intimate Partner Violence: Not on file   Depression: Not on file   Housing Stability: Not on file   Utilities: Not on file   Digital Equity: Not on file   Health Literacy: Not on file        FH:  Family  "History   Problem Relation Name Age of Onset    No Known Problems Mother      Colon cancer Father      Other (COLORECTAL CANCER) Father      Breast cancer Sister      Genetic Disease Carrier Sister      Other (MONOALLELIC MUTATION OF DENY GENE) Sister      Breast cancer Father's Sister          Allergies:   Allergies   Allergen Reactions    Penicillins Unknown       Meds:   Current Outpatient Medications   Medication Instructions    aspirin 81 mg, Daily    azelastine (Astelin) 137 mcg (0.1 %) nasal spray SPRAY 2 SPRAYS BY INTRANASAL ROUTE TWICE A DAY    fluticasone (Flonase) 50 mcg/actuation nasal spray INSTILL 2 SPRAYS BY INTRANASAL ROUTE EVERY DAY    omeprazole (PRILOSEC) 40 mg, oral, Daily    rosuvastatin (CRESTOR) 20 mg, oral, Daily       ROS:  All systems were reviewed and noted to be negative, other than described above.     Objective:  Last Recorded Vitals  Blood pressure (!) 162/93, pulse 86, height 1.676 m (5' 6\"), weight 74.8 kg (165 lb), SpO2 96%.    Exam:  Constitutional: Well appearing, NAD.  CARD: No tachycardia, RRR.  RESP: Unlabored breathing.  ABD: Soft, nontender, non-distended.  SKIN: No lesions.  NEURO: No focal deficits noted. Motor/sensory intact.   EXTREMITIES: Warm & well perfused.   PULSES: Bilateral palpable radial and carotid pulses    Imaging Reviewed:  CTA head and neck 1/7/25 - independently reviewed  IMPRESSION:  CTA head:      Mild focal narrowing involving the P2 segments of the bilateral  posterior cerebral arteries, otherwise no striking narrowing of the  visualized arteries in the head.      CTA neck:      1. There is 75-80% luminal narrowing of the right carotid bulb and  15-20% luminal narrowing of the left carotid bulb, according to  NASCET criteria. Luminal narrowing on the right is primarily due to  noncalcified plaque with some contribution from atherosclerotic  calcifications.      2. Otherwise, no striking narrowing of the visualized arteries in the  neck.      This study " was interpreted at [facility].        Assessment & Plan:  Fadi North is a 79 y.o. male with asymptomatic >70% right carotid artery stenosis. Has recent carotid duplex and CTA head and neck demonstrating 70-80% right ICA stenosis, 15-20% left ICA stenosis. Patient meets criteria for right CEA.    - plan for right CEA, possibly on 1/30/25 pending OR availability  - risks, benefits, and alternative discussed and informed consent obtained in office  - continue ASA and statin therapy    Patient seen and examined with Dr. Ruby Rowe MD  Vascular Surgery PGY6

## 2025-01-28 ENCOUNTER — TELEPHONE (OUTPATIENT)
Dept: VASCULAR SURGERY | Facility: HOSPITAL | Age: 80
End: 2025-01-28
Payer: COMMERCIAL

## 2025-01-28 PROCEDURE — 86850 RBC ANTIBODY SCREEN: CPT

## 2025-01-28 PROCEDURE — 86900 BLOOD TYPING SEROLOGIC ABO: CPT | Mod: OUT | Performed by: SURGERY

## 2025-01-28 PROCEDURE — 86901 BLOOD TYPING SEROLOGIC RH(D): CPT

## 2025-01-28 PROCEDURE — 86900 BLOOD TYPING SEROLOGIC ABO: CPT

## 2025-01-28 NOTE — TELEPHONE ENCOUNTER
Spoke with patient to replay pre-op instructions (below), also emailed him a copy.      Instructed him to get labs drawn today.     Of note, patient self-catheterizes 4 times daily and is asking if he can catheterize post-op (get up out of bed).  I informed him this will not be possible and the OR can catheterize him.  He agreed.     All questions answered.   Le Kemp, XAVIER-CNP  Vascular Surgery     Procedure Date:  January 30th 2025  Tentative Arrival Time:  9:10 AM.  You'll receive a phone call from the OR confirming the exact time the day prior to surgery.    IMPORTANT:   Get your labs drawn today.     The Day BEFORE Surgery:    Do NOT eat any food after midnight the night before your surgery.   You are permitted to have clear liquids with your medications the morning of surgery.  Must be taken 2 hours before your surgery.    Shower the night before or the morning of surgery.     The Day OF your Surgery:  You can take the following medications as scheduled with small sips of clear liquids the morning of your surgery: aspirin, omeprazole, rosuvastatin.  Hold all vitamins.   Bring a current list of medications, photo ID, and insurance card and report to the 2nd Floor [facility] Registration Desk at your scheduled arrival time.    Dress comfortably.  Loose clothing is recommended.  Do not bring valuables such as cash or jewelry.   Prosthetic devices such as contact lenses, hearing aids, dentures must be removed before surgery.   Your surgeon will give you specific instructions before and after surgery.  Please contact our office at [phone] option [phone] if you have any questions or concerns.

## 2025-01-29 ENCOUNTER — LAB REQUISITION (OUTPATIENT)
Dept: LAB | Facility: HOSPITAL | Age: 80
End: 2025-01-29
Payer: COMMERCIAL

## 2025-01-29 ENCOUNTER — ANESTHESIA EVENT (OUTPATIENT)
Dept: OPERATING ROOM | Facility: HOSPITAL | Age: 80
End: 2025-01-29
Payer: COMMERCIAL

## 2025-01-29 DIAGNOSIS — I65.21 CAROTID STENOSIS, ASYMPTOMATIC, RIGHT: Primary | ICD-10-CM

## 2025-01-29 DIAGNOSIS — I65.21 OCCLUSION AND STENOSIS OF RIGHT CAROTID ARTERY: ICD-10-CM

## 2025-01-29 LAB
ABO GROUP (TYPE) IN BLOOD: NORMAL
ANTIBODY SCREEN: NORMAL
RH FACTOR (ANTIGEN D): NORMAL

## 2025-01-29 NOTE — PROGRESS NOTES
Pharmacy Medication History Review    Fadi North is a 79 y.o. male who is planned to be admitted for Carotid stenosis, asymptomatic, right. Pharmacy called the patient prior to their scheduled procedure and reviewed the patient's oziju-ba-adpennaah medications for accuracy.    Medications ADDED:  none  Medications CHANGED:  none  Medications REMOVED:   Azelastine nasal spray  Flonase nasal spray    Please review updated prior to admission medication list and comments regarding how patient may be taking medications differently by going to Admission tab --> Admission Orders --> Admit Orders / Review prior to admission medications.     Preferred pharmacy, last doses of medications, and allergies to be confirmed with patient by nursing the day of procedure.     Sources used to complete the med history include:  [facility]  Pharmacy dispense history  Patient interview  Chart Review  Care Everywhere     Below are additional concerns with the patient's PTA list.  none    Deirdre Meyers [facility]  Please reach out via Secure Chat for questions or call Blog[facility] or [facility]

## 2025-01-30 ENCOUNTER — HOSPITAL ENCOUNTER (INPATIENT)
Facility: HOSPITAL | Age: 80
LOS: 1 days | Discharge: HOME | End: 2025-01-31
Attending: SURGERY | Admitting: SURGERY
Payer: COMMERCIAL

## 2025-01-30 ENCOUNTER — HOSPITAL ENCOUNTER (INPATIENT)
Dept: NEUROLOGY | Facility: HOSPITAL | Age: 80
Discharge: HOME | End: 2025-01-30
Payer: COMMERCIAL

## 2025-01-30 ENCOUNTER — ANESTHESIA (OUTPATIENT)
Dept: OPERATING ROOM | Facility: HOSPITAL | Age: 80
End: 2025-01-30
Payer: COMMERCIAL

## 2025-01-30 DIAGNOSIS — I65.21 CAROTID STENOSIS, ASYMPTOMATIC, RIGHT: Primary | ICD-10-CM

## 2025-01-30 DIAGNOSIS — I65.21 ASYMPTOMATIC STENOSIS OF RIGHT CAROTID ARTERY: Primary | ICD-10-CM

## 2025-01-30 DIAGNOSIS — Z98.890 S/P CAROTID ENDARTERECTOMY: ICD-10-CM

## 2025-01-30 LAB
ABO GROUP (TYPE) IN BLOOD: NORMAL
ACT BLD: 145 SEC (ref 83–199)
ACT BLD: 168 SEC (ref 83–199)
ACT BLD: 226 SEC (ref 83–199)
ACT BLD: 281 SEC (ref 83–199)
RH FACTOR (ANTIGEN D): NORMAL

## 2025-01-30 PROCEDURE — 7100000002 HC RECOVERY ROOM TIME - EACH INCREMENTAL 1 MINUTE: Performed by: SURGERY

## 2025-01-30 PROCEDURE — 2500000001 HC RX 250 WO HCPCS SELF ADMINISTERED DRUGS (ALT 637 FOR MEDICARE OP): Performed by: STUDENT IN AN ORGANIZED HEALTH CARE EDUCATION/TRAINING PROGRAM

## 2025-01-30 PROCEDURE — 2500000005 HC RX 250 GENERAL PHARMACY W/O HCPCS: Performed by: SURGERY

## 2025-01-30 PROCEDURE — 85347 COAGULATION TIME ACTIVATED: CPT

## 2025-01-30 PROCEDURE — 2500000004 HC RX 250 GENERAL PHARMACY W/ HCPCS (ALT 636 FOR OP/ED): Performed by: STUDENT IN AN ORGANIZED HEALTH CARE EDUCATION/TRAINING PROGRAM

## 2025-01-30 PROCEDURE — 7100000001 HC RECOVERY ROOM TIME - INITIAL BASE CHARGE: Performed by: SURGERY

## 2025-01-30 PROCEDURE — 2720000007 HC OR 272 NO HCPCS: Performed by: SURGERY

## 2025-01-30 PROCEDURE — C1768 GRAFT, VASCULAR: HCPCS | Performed by: SURGERY

## 2025-01-30 PROCEDURE — 36620 INSERTION CATHETER ARTERY: CPT

## 2025-01-30 PROCEDURE — 36415 COLL VENOUS BLD VENIPUNCTURE: CPT | Performed by: STUDENT IN AN ORGANIZED HEALTH CARE EDUCATION/TRAINING PROGRAM

## 2025-01-30 PROCEDURE — 2500000004 HC RX 250 GENERAL PHARMACY W/ HCPCS (ALT 636 FOR OP/ED): Performed by: SURGERY

## 2025-01-30 PROCEDURE — 2500000001 HC RX 250 WO HCPCS SELF ADMINISTERED DRUGS (ALT 637 FOR MEDICARE OP)

## 2025-01-30 PROCEDURE — 2780000003 HC OR 278 NO HCPCS: Performed by: SURGERY

## 2025-01-30 PROCEDURE — 95925 SOMATOSENSORY TESTING: CPT

## 2025-01-30 PROCEDURE — 2500000004 HC RX 250 GENERAL PHARMACY W/ HCPCS (ALT 636 FOR OP/ED): Mod: JZ

## 2025-01-30 PROCEDURE — 2060000001 HC INTERMEDIATE ICU ROOM DAILY

## 2025-01-30 PROCEDURE — 3700000002 HC GENERAL ANESTHESIA TIME - EACH INCREMENTAL 1 MINUTE: Performed by: SURGERY

## 2025-01-30 PROCEDURE — 3700000001 HC GENERAL ANESTHESIA TIME - INITIAL BASE CHARGE: Performed by: SURGERY

## 2025-01-30 PROCEDURE — 03UH0KZ SUPPLEMENT RIGHT COMMON CAROTID ARTERY WITH NONAUTOLOGOUS TISSUE SUBSTITUTE, OPEN APPROACH: ICD-10-PCS | Performed by: SURGERY

## 2025-01-30 PROCEDURE — 35301 RECHANNELING OF ARTERY: CPT | Performed by: SURGERY

## 2025-01-30 PROCEDURE — 3600000004 HC OR TIME - INITIAL BASE CHARGE - PROCEDURE LEVEL FOUR: Performed by: SURGERY

## 2025-01-30 PROCEDURE — 03CH0ZZ EXTIRPATION OF MATTER FROM RIGHT COMMON CAROTID ARTERY, OPEN APPROACH: ICD-10-PCS | Performed by: SURGERY

## 2025-01-30 PROCEDURE — 2500000004 HC RX 250 GENERAL PHARMACY W/ HCPCS (ALT 636 FOR OP/ED)

## 2025-01-30 PROCEDURE — 3600000009 HC OR TIME - EACH INCREMENTAL 1 MINUTE - PROCEDURE LEVEL FOUR: Performed by: SURGERY

## 2025-01-30 DEVICE — IMPLANTABLE DEVICE: Type: IMPLANTABLE DEVICE | Site: CAROTID | Status: FUNCTIONAL

## 2025-01-30 RX ORDER — ESMOLOL HYDROCHLORIDE 10 MG/ML
INJECTION INTRAVENOUS AS NEEDED
Status: DISCONTINUED | OUTPATIENT
Start: 2025-01-30 | End: 2025-01-30

## 2025-01-30 RX ORDER — PHENYLEPHRINE HCL IN 0.9% NACL 0.4MG/10ML
SYRINGE (ML) INTRAVENOUS AS NEEDED
Status: DISCONTINUED | OUTPATIENT
Start: 2025-01-30 | End: 2025-01-30

## 2025-01-30 RX ORDER — ROSUVASTATIN CALCIUM 20 MG/1
20 TABLET, COATED ORAL DAILY
Status: DISCONTINUED | OUTPATIENT
Start: 2025-01-31 | End: 2025-01-31 | Stop reason: HOSPADM

## 2025-01-30 RX ORDER — CEFAZOLIN SODIUM 2 G/100ML
2 INJECTION, SOLUTION INTRAVENOUS EVERY 8 HOURS
Status: COMPLETED | OUTPATIENT
Start: 2025-01-30 | End: 2025-01-31

## 2025-01-30 RX ORDER — LIDOCAINE HYDROCHLORIDE 10 MG/ML
INJECTION, SOLUTION INFILTRATION; PERINEURAL AS NEEDED
Status: DISCONTINUED | OUTPATIENT
Start: 2025-01-30 | End: 2025-01-30

## 2025-01-30 RX ORDER — CEFAZOLIN 1 G/1
INJECTION, POWDER, FOR SOLUTION INTRAVENOUS AS NEEDED
Status: DISCONTINUED | OUTPATIENT
Start: 2025-01-30 | End: 2025-01-30

## 2025-01-30 RX ORDER — PROPOFOL 10 MG/ML
INJECTION, EMULSION INTRAVENOUS CONTINUOUS PRN
Status: DISCONTINUED | OUTPATIENT
Start: 2025-01-30 | End: 2025-01-30

## 2025-01-30 RX ORDER — ASPIRIN 81 MG/1
81 TABLET ORAL DAILY
Status: DISCONTINUED | OUTPATIENT
Start: 2025-01-31 | End: 2025-01-31 | Stop reason: HOSPADM

## 2025-01-30 RX ORDER — HEPARIN SODIUM 5000 [USP'U]/ML
5000 INJECTION, SOLUTION INTRAVENOUS; SUBCUTANEOUS EVERY 8 HOURS
Status: DISCONTINUED | OUTPATIENT
Start: 2025-01-30 | End: 2025-01-31 | Stop reason: HOSPADM

## 2025-01-30 RX ORDER — LABETALOL HYDROCHLORIDE 5 MG/ML
10 INJECTION, SOLUTION INTRAVENOUS ONCE
Status: COMPLETED | OUTPATIENT
Start: 2025-01-30 | End: 2025-01-30

## 2025-01-30 RX ORDER — OXYCODONE HYDROCHLORIDE 5 MG/1
10 TABLET ORAL EVERY 4 HOURS PRN
Status: DISCONTINUED | OUTPATIENT
Start: 2025-01-30 | End: 2025-01-30 | Stop reason: HOSPADM

## 2025-01-30 RX ORDER — DROPERIDOL 2.5 MG/ML
0.62 INJECTION, SOLUTION INTRAMUSCULAR; INTRAVENOUS ONCE AS NEEDED
Status: DISCONTINUED | OUTPATIENT
Start: 2025-01-30 | End: 2025-01-30 | Stop reason: HOSPADM

## 2025-01-30 RX ORDER — FENTANYL CITRATE 50 UG/ML
INJECTION, SOLUTION INTRAMUSCULAR; INTRAVENOUS AS NEEDED
Status: DISCONTINUED | OUTPATIENT
Start: 2025-01-30 | End: 2025-01-30

## 2025-01-30 RX ORDER — ACETAMINOPHEN 325 MG/1
650 TABLET ORAL EVERY 6 HOURS SCHEDULED
Status: DISCONTINUED | OUTPATIENT
Start: 2025-01-30 | End: 2025-01-31 | Stop reason: HOSPADM

## 2025-01-30 RX ORDER — OXYCODONE HYDROCHLORIDE 5 MG/1
5 TABLET ORAL EVERY 4 HOURS PRN
Status: DISCONTINUED | OUTPATIENT
Start: 2025-01-30 | End: 2025-01-30 | Stop reason: HOSPADM

## 2025-01-30 RX ORDER — HYDROMORPHONE HYDROCHLORIDE 0.2 MG/ML
0.2 INJECTION INTRAMUSCULAR; INTRAVENOUS; SUBCUTANEOUS EVERY 5 MIN PRN
Status: DISCONTINUED | OUTPATIENT
Start: 2025-01-30 | End: 2025-01-30 | Stop reason: HOSPADM

## 2025-01-30 RX ORDER — ROCURONIUM BROMIDE 10 MG/ML
INJECTION, SOLUTION INTRAVENOUS AS NEEDED
Status: DISCONTINUED | OUTPATIENT
Start: 2025-01-30 | End: 2025-01-30

## 2025-01-30 RX ORDER — PROTAMINE SULFATE 10 MG/ML
INJECTION, SOLUTION INTRAVENOUS AS NEEDED
Status: DISCONTINUED | OUTPATIENT
Start: 2025-01-30 | End: 2025-01-30

## 2025-01-30 RX ORDER — METOCLOPRAMIDE HYDROCHLORIDE 5 MG/ML
10 INJECTION INTRAMUSCULAR; INTRAVENOUS ONCE AS NEEDED
Status: COMPLETED | OUTPATIENT
Start: 2025-01-30 | End: 2025-01-30

## 2025-01-30 RX ORDER — ONDANSETRON HYDROCHLORIDE 2 MG/ML
INJECTION, SOLUTION INTRAVENOUS AS NEEDED
Status: DISCONTINUED | OUTPATIENT
Start: 2025-01-30 | End: 2025-01-30

## 2025-01-30 RX ORDER — HEPARIN SODIUM 1000 [USP'U]/ML
INJECTION, SOLUTION INTRAVENOUS; SUBCUTANEOUS AS NEEDED
Status: DISCONTINUED | OUTPATIENT
Start: 2025-01-30 | End: 2025-01-30

## 2025-01-30 RX ORDER — PANTOPRAZOLE SODIUM 40 MG/1
40 TABLET, DELAYED RELEASE ORAL
Status: DISCONTINUED | OUTPATIENT
Start: 2025-01-31 | End: 2025-01-31 | Stop reason: HOSPADM

## 2025-01-30 RX ORDER — PHENYLEPHRINE 10 MG/250 ML(40 MCG/ML)IN 0.9 % SOD.CHLORIDE INTRAVENOUS
CONTINUOUS PRN
Status: DISCONTINUED | OUTPATIENT
Start: 2025-01-30 | End: 2025-01-30

## 2025-01-30 RX ORDER — OXYCODONE HYDROCHLORIDE 5 MG/1
5 TABLET ORAL EVERY 4 HOURS PRN
Status: DISCONTINUED | OUTPATIENT
Start: 2025-01-30 | End: 2025-01-31 | Stop reason: HOSPADM

## 2025-01-30 RX ORDER — NALOXONE HYDROCHLORIDE 0.4 MG/ML
0.2 INJECTION, SOLUTION INTRAMUSCULAR; INTRAVENOUS; SUBCUTANEOUS EVERY 5 MIN PRN
Status: DISCONTINUED | OUTPATIENT
Start: 2025-01-30 | End: 2025-01-31 | Stop reason: HOSPADM

## 2025-01-30 RX ORDER — ACETAMINOPHEN 325 MG/1
650 TABLET ORAL EVERY 4 HOURS PRN
Status: DISCONTINUED | OUTPATIENT
Start: 2025-01-30 | End: 2025-01-30 | Stop reason: HOSPADM

## 2025-01-30 RX ORDER — HYDRALAZINE HYDROCHLORIDE 20 MG/ML
5 INJECTION INTRAMUSCULAR; INTRAVENOUS ONCE
Status: COMPLETED | OUTPATIENT
Start: 2025-01-30 | End: 2025-01-30

## 2025-01-30 RX ADMIN — OXYCODONE 5 MG: 5 TABLET ORAL at 17:01

## 2025-01-30 RX ADMIN — ROCURONIUM 10 MG: 50 INJECTION, SOLUTION INTRAVENOUS at 12:24

## 2025-01-30 RX ADMIN — ESMOLOL HYDROCHLORIDE 30 MG: 10 INJECTION, SOLUTION INTRAVENOUS at 11:17

## 2025-01-30 RX ADMIN — HYDROMORPHONE HYDROCHLORIDE 0.5 MG: 0.5 INJECTION, SOLUTION INTRAMUSCULAR; INTRAVENOUS; SUBCUTANEOUS at 14:21

## 2025-01-30 RX ADMIN — FENTANYL CITRATE 100 MCG: 50 INJECTION, SOLUTION INTRAMUSCULAR; INTRAVENOUS at 10:45

## 2025-01-30 RX ADMIN — SODIUM CHLORIDE, SODIUM LACTATE, POTASSIUM CHLORIDE, AND CALCIUM CHLORIDE: 600; 310; 30; 20 INJECTION, SOLUTION INTRAVENOUS at 10:30

## 2025-01-30 RX ADMIN — ROCURONIUM 10 MG: 50 INJECTION, SOLUTION INTRAVENOUS at 11:36

## 2025-01-30 RX ADMIN — PROPOFOL 150 MG: 10 INJECTION, EMULSION INTRAVENOUS at 10:45

## 2025-01-30 RX ADMIN — PROPOFOL 50 MG: 10 INJECTION, EMULSION INTRAVENOUS at 11:04

## 2025-01-30 RX ADMIN — METOCLOPRAMIDE HYDROCHLORIDE 10 MG: 5 INJECTION INTRAMUSCULAR; INTRAVENOUS at 13:37

## 2025-01-30 RX ADMIN — ACETAMINOPHEN 650 MG: 325 TABLET ORAL at 23:28

## 2025-01-30 RX ADMIN — Medication 80 MCG: at 11:39

## 2025-01-30 RX ADMIN — SUGAMMADEX 200 MG: 100 INJECTION, SOLUTION INTRAVENOUS at 12:50

## 2025-01-30 RX ADMIN — HEPARIN SODIUM 5000 UNITS: 5000 INJECTION, SOLUTION INTRAVENOUS; SUBCUTANEOUS at 18:33

## 2025-01-30 RX ADMIN — ROCURONIUM 70 MG: 50 INJECTION, SOLUTION INTRAVENOUS at 10:45

## 2025-01-30 RX ADMIN — PHENYLEPHRINE-NACL IV SOLUTION 10 MG/250ML-0.9% 0.2 MCG/KG/MIN: 10-0.9/25 SOLUTION at 12:09

## 2025-01-30 RX ADMIN — PROPOFOL 50 MCG/KG/MIN: 10 INJECTION, EMULSION INTRAVENOUS at 10:57

## 2025-01-30 RX ADMIN — LABETALOL HYDROCHLORIDE 10 MG: 5 INJECTION INTRAVENOUS at 15:46

## 2025-01-30 RX ADMIN — HEPARIN SODIUM 8000 UNITS: 1000 INJECTION INTRAVENOUS; SUBCUTANEOUS at 11:34

## 2025-01-30 RX ADMIN — LIDOCAINE HYDROCHLORIDE 100 MG: 10 INJECTION, SOLUTION INFILTRATION; PERINEURAL at 10:45

## 2025-01-30 RX ADMIN — HYDROMORPHONE HYDROCHLORIDE 0.5 MG: 0.5 INJECTION, SOLUTION INTRAMUSCULAR; INTRAVENOUS; SUBCUTANEOUS at 13:37

## 2025-01-30 RX ADMIN — ACETAMINOPHEN 650 MG: 325 TABLET ORAL at 18:34

## 2025-01-30 RX ADMIN — ONDANSETRON 4 MG: 2 INJECTION, SOLUTION INTRAMUSCULAR; INTRAVENOUS at 12:36

## 2025-01-30 RX ADMIN — HYDRALAZINE HYDROCHLORIDE 5 MG: 20 INJECTION INTRAMUSCULAR; INTRAVENOUS at 15:00

## 2025-01-30 RX ADMIN — PROTAMINE SULFATE 20 MG: 10 INJECTION, SOLUTION INTRAVENOUS at 12:27

## 2025-01-30 RX ADMIN — CEFAZOLIN SODIUM 2 G: 2 INJECTION, SOLUTION INTRAVENOUS at 21:24

## 2025-01-30 RX ADMIN — CEFAZOLIN 2 G: 1 INJECTION, POWDER, FOR SOLUTION INTRAMUSCULAR; INTRAVENOUS at 10:55

## 2025-01-30 RX ADMIN — DEXAMETHASONE SODIUM PHOSPHATE 4 MG: 4 INJECTION INTRA-ARTICULAR; INTRALESIONAL; INTRAMUSCULAR; INTRAVENOUS; SOFT TISSUE at 10:45

## 2025-01-30 RX ADMIN — LIDOCAINE HYDROCHLORIDE 80 MG: 10 INJECTION, SOLUTION INFILTRATION; PERINEURAL at 12:50

## 2025-01-30 SDOH — ECONOMIC STABILITY: FOOD INSECURITY: WITHIN THE PAST 12 MONTHS, THE FOOD YOU BOUGHT JUST DIDN'T LAST AND YOU DIDN'T HAVE MONEY TO GET MORE.: NEVER TRUE

## 2025-01-30 SDOH — SOCIAL STABILITY: SOCIAL INSECURITY
WITHIN THE LAST YEAR, HAVE YOU BEEN RAPED OR FORCED TO HAVE ANY KIND OF SEXUAL ACTIVITY BY YOUR PARTNER OR EX-PARTNER?: NO

## 2025-01-30 SDOH — ECONOMIC STABILITY: INCOME INSECURITY: IN THE PAST 12 MONTHS HAS THE ELECTRIC, GAS, OIL, OR WATER COMPANY THREATENED TO SHUT OFF SERVICES IN YOUR HOME?: NO

## 2025-01-30 SDOH — SOCIAL STABILITY: SOCIAL INSECURITY: ARE YOU OR HAVE YOU BEEN THREATENED OR ABUSED PHYSICALLY, EMOTIONALLY, OR SEXUALLY BY ANYONE?: NO

## 2025-01-30 SDOH — SOCIAL STABILITY: SOCIAL INSECURITY: DOES ANYONE TRY TO KEEP YOU FROM HAVING/CONTACTING OTHER FRIENDS OR DOING THINGS OUTSIDE YOUR HOME?: NO

## 2025-01-30 SDOH — SOCIAL STABILITY: SOCIAL INSECURITY: WITHIN THE LAST YEAR, HAVE YOU BEEN HUMILIATED OR EMOTIONALLY ABUSED IN OTHER WAYS BY YOUR PARTNER OR EX-PARTNER?: NO

## 2025-01-30 SDOH — SOCIAL STABILITY: SOCIAL INSECURITY: HAVE YOU HAD THOUGHTS OF HARMING ANYONE ELSE?: NO

## 2025-01-30 SDOH — SOCIAL STABILITY: SOCIAL INSECURITY: ABUSE: ADULT

## 2025-01-30 SDOH — SOCIAL STABILITY: SOCIAL INSECURITY: HAS ANYONE EVER THREATENED TO HURT YOUR FAMILY OR YOUR PETS?: NO

## 2025-01-30 SDOH — SOCIAL STABILITY: SOCIAL INSECURITY: HAVE YOU HAD ANY THOUGHTS OF HARMING ANYONE ELSE?: NO

## 2025-01-30 SDOH — SOCIAL STABILITY: SOCIAL INSECURITY
WITHIN THE LAST YEAR, HAVE YOU BEEN KICKED, HIT, SLAPPED, OR OTHERWISE PHYSICALLY HURT BY YOUR PARTNER OR EX-PARTNER?: NO

## 2025-01-30 SDOH — ECONOMIC STABILITY: FOOD INSECURITY: WITHIN THE PAST 12 MONTHS, YOU WORRIED THAT YOUR FOOD WOULD RUN OUT BEFORE YOU GOT THE MONEY TO BUY MORE.: NEVER TRUE

## 2025-01-30 SDOH — SOCIAL STABILITY: SOCIAL INSECURITY: WERE YOU ABLE TO COMPLETE ALL THE BEHAVIORAL HEALTH SCREENINGS?: YES

## 2025-01-30 SDOH — SOCIAL STABILITY: SOCIAL INSECURITY: DO YOU FEEL UNSAFE GOING BACK TO THE PLACE WHERE YOU ARE LIVING?: NO

## 2025-01-30 SDOH — SOCIAL STABILITY: SOCIAL INSECURITY: DO YOU FEEL ANYONE HAS EXPLOITED OR TAKEN ADVANTAGE OF YOU FINANCIALLY OR OF YOUR PERSONAL PROPERTY?: NO

## 2025-01-30 SDOH — SOCIAL STABILITY: SOCIAL INSECURITY: WITHIN THE LAST YEAR, HAVE YOU BEEN AFRAID OF YOUR PARTNER OR EX-PARTNER?: NO

## 2025-01-30 SDOH — SOCIAL STABILITY: SOCIAL INSECURITY: ARE THERE ANY APPARENT SIGNS OF INJURIES/BEHAVIORS THAT COULD BE RELATED TO ABUSE/NEGLECT?: NO

## 2025-01-30 ASSESSMENT — LIFESTYLE VARIABLES
HOW MANY STANDARD DRINKS CONTAINING ALCOHOL DO YOU HAVE ON A TYPICAL DAY: 1 OR 2
SKIP TO QUESTIONS 9-10: 0
HOW OFTEN DURING THE LAST YEAR HAVE YOU HAD A FEELING OF GUILT OR REMORSE AFTER DRINKING: NEVER
HOW OFTEN DO YOU HAVE A DRINK CONTAINING ALCOHOL: 4 OR MORE TIMES A WEEK
HOW OFTEN DO YOU HAVE 6 OR MORE DRINKS ON ONE OCCASION: LESS THAN MONTHLY
HOW OFTEN DURING THE LAST YEAR HAVE YOU FOUND THAT YOU WERE NOT ABLE TO STOP DRINKING ONCE YOU HAD STARTED: NEVER
HOW OFTEN DURING THE LAST YEAR HAVE YOU FAILED TO DO WHAT WAS NORMALLY EXPECTED FROM YOU BECAUSE OF DRINKING: NEVER
AUDIT TOTAL SCORE: 0
HAS A RELATIVE, FRIEND, DOCTOR, OR ANOTHER HEALTH PROFESSIONAL EXPRESSED CONCERN ABOUT YOUR DRINKING OR SUGGESTED YOU CUT DOWN: NO
HOW OFTEN DURING THE LAST YEAR HAVE YOU BEEN UNABLE TO REMEMBER WHAT HAPPENED THE NIGHT BEFORE BECAUSE YOU HAD BEEN DRINKING: NEVER
AUDIT-C TOTAL SCORE: 5
HAVE YOU OR SOMEONE ELSE BEEN INJURED AS A RESULT OF YOUR DRINKING: NO
AUDIT TOTAL SCORE: 5
AUDIT-C TOTAL SCORE: 5
HOW OFTEN DURING THE LAST YEAR HAVE YOU NEEDED AN ALCOHOLIC DRINK FIRST THING IN THE MORNING TO GET YOURSELF GOING AFTER A NIGHT OF HEAVY DRINKING: NEVER

## 2025-01-30 ASSESSMENT — COGNITIVE AND FUNCTIONAL STATUS - GENERAL
DAILY ACTIVITIY SCORE: 24
MOBILITY SCORE: 24
DAILY ACTIVITIY SCORE: 24
MOBILITY SCORE: 24
PATIENT BASELINE BEDBOUND: NO

## 2025-01-30 ASSESSMENT — ACTIVITIES OF DAILY LIVING (ADL)
WALKS IN HOME: INDEPENDENT
GROOMING: INDEPENDENT
FEEDING YOURSELF: INDEPENDENT
HEARING - LEFT EAR: FUNCTIONAL
LACK_OF_TRANSPORTATION: NO
PATIENT'S MEMORY ADEQUATE TO SAFELY COMPLETE DAILY ACTIVITIES?: YES
HEARING - RIGHT EAR: FUNCTIONAL
TOILETING: INDEPENDENT
JUDGMENT_ADEQUATE_SAFELY_COMPLETE_DAILY_ACTIVITIES: YES
ADEQUATE_TO_COMPLETE_ADL: YES
BATHING: INDEPENDENT
DRESSING YOURSELF: INDEPENDENT

## 2025-01-30 ASSESSMENT — PAIN SCALES - GENERAL
PAINLEVEL_OUTOF10: 3
PAINLEVEL_OUTOF10: 6
PAINLEVEL_OUTOF10: 4
PAINLEVEL_OUTOF10: 4
PAINLEVEL_OUTOF10: 3
PAINLEVEL_OUTOF10: 0 - NO PAIN
PAINLEVEL_OUTOF10: 4
PAINLEVEL_OUTOF10: 7
PAINLEVEL_OUTOF10: 0 - NO PAIN
PAINLEVEL_OUTOF10: 4
PAINLEVEL_OUTOF10: 4
PAINLEVEL_OUTOF10: 7
PAINLEVEL_OUTOF10: 3
PAINLEVEL_OUTOF10: 3
PAINLEVEL_OUTOF10: 0 - NO PAIN
PAINLEVEL_OUTOF10: 3
PAINLEVEL_OUTOF10: 4
PAINLEVEL_OUTOF10: 7
PAINLEVEL_OUTOF10: 3
PAINLEVEL_OUTOF10: 6

## 2025-01-30 ASSESSMENT — COLUMBIA-SUICIDE SEVERITY RATING SCALE - C-SSRS
1. IN THE PAST MONTH, HAVE YOU WISHED YOU WERE DEAD OR WISHED YOU COULD GO TO SLEEP AND NOT WAKE UP?: NO
2. HAVE YOU ACTUALLY HAD ANY THOUGHTS OF KILLING YOURSELF?: NO
6. HAVE YOU EVER DONE ANYTHING, STARTED TO DO ANYTHING, OR PREPARED TO DO ANYTHING TO END YOUR LIFE?: NO

## 2025-01-30 ASSESSMENT — PAIN - FUNCTIONAL ASSESSMENT
PAIN_FUNCTIONAL_ASSESSMENT: 0-10
PAIN_FUNCTIONAL_ASSESSMENT: UNABLE TO SELF-REPORT
PAIN_FUNCTIONAL_ASSESSMENT: 0-10
PAIN_FUNCTIONAL_ASSESSMENT: 0-10
PAIN_FUNCTIONAL_ASSESSMENT: UNABLE TO SELF-REPORT
PAIN_FUNCTIONAL_ASSESSMENT: 0-10

## 2025-01-30 ASSESSMENT — PATIENT HEALTH QUESTIONNAIRE - PHQ9
2. FEELING DOWN, DEPRESSED OR HOPELESS: NOT AT ALL
SUM OF ALL RESPONSES TO PHQ9 QUESTIONS 1 & 2: 0
1. LITTLE INTEREST OR PLEASURE IN DOING THINGS: NOT AT ALL

## 2025-01-30 NOTE — ANESTHESIA PROCEDURE NOTES
Airway  Date/Time: 1/30/2025 10:48 AM  Urgency: elective    Airway not difficult    Staffing  Performed: resident   Authorized by: Sindi Rodriguez MD    Performed by: Kim Sheriff MD  Patient location during procedure: OR    Indications and Patient Condition  Indications for airway management: anesthesia  Spontaneous ventilation: present  Sedation level: deep  Preoxygenated: yes  Patient position: sniffing  Mask difficulty assessment: 1 - vent by mask  Planned trial extubation    Final Airway Details  Final airway type: endotracheal airway      Successful airway: ETT  Cuffed: yes   Successful intubation technique: direct laryngoscopy  Facilitating devices/methods: intubating stylet and anterior pressure/BURP  Endotracheal tube insertion site: oral  Blade: Jean Marie  Blade size: #4  ETT size (mm): 7.5  Cormack-Lehane Classification: grade IIb - view of arytenoids or posterior of glottis only  Placement verified by: capnometry   Measured from: lips  ETT to lips (cm): 23  Number of attempts at approach: 1

## 2025-01-30 NOTE — BRIEF OP NOTE
Date: 25 OR Location: [facility] OR 16     Name: Fadi North, : 1945, Age: 79 y.o., MRN: [id number], Sex: male    Pre-op Diagnosis: Asymptomatic right carotid stenosis  Post-op Diagnosis: Jose Maria    Procedures: Right carotid endarterectomy with patch angioplasty and intra-operative SSEP/EEG monitoring    Surgeons: Burton Beltran MD    Resident/Fellow/Other Assistant: Mj Echevarria MD; Thaddeus Cleaning MD    Anesthesia: general ASA: III  Anesthesia Staff: Anesthesiologist: Sindi Rodriguez MD  Anesthesia Resident: Kim Sheriff MD    Findings: high grade right ICA stenosis, neuro intact    Specimens: right carotid plaque    Estimated Blood Loss: 50cc  Fluids and Transfusions: no blood transfusion  Urine Output: N/A  Lines: PIVs  Implants:   Implant Name Type Inv. Item Serial No.  Lot No. LRB No. Used Action   VASCULAR PATCH PERIPHERAL .8CM X 8 CM - [id number] Implant VASCULAR PATCH PERIPHERAL .8CM X 8 CM  [facility] [id number] Right 1 Implanted     Complications: None  Condition: stable  Disposition: PACU - hemodynamically stable.    Mj Echevarria MD

## 2025-01-30 NOTE — OP NOTE
Operative Date: 1/30/2025     PREOPERATIVE DIAGNOSIS:   Right carotid artery stenosis.      POSTOPERATIVE DIAGNOSIS:   Same.     PROCEDURE PERFORMED:  Right carotid endarterectomy with patch angioplasty using bovine pericardial patch  Intraoperative EEG/SSEP monitoring.      SURGEON: Burton Beltran MD, PhD.      ASSISTANT: Mj Echevarria MD.      ANESTHESIA:  General      FINDINGS:  - No concerning EEG/SSEP changes during the case.  - No focal neurological deficits at the completion of the case.     EBL (ml): 10 mL     IVF (ml): Per anesthesia report        INDICATION:  Helen HEREDIA Male is 79 y.o. male with Asx. R ICA stenosis. Patient is here today for right carotid endarterectomy.    I discussed the plan for right carotid endarterectomy (CEA). We reviewed the options for care, including medical management alone or carotid artery stenting. CEA was recommended to reduce the risk of future stroke, but it was explained that there is no expected change in current symptoms or condition. The typical recovery was discussed. The potential risks were reviewed, including stroke, myocardial infarction, death, cranial injury, bleeding, infection, recurrent stenosis, hyper- or hypotension, dysrhythmia, or other serious complication. The patient indicated understanding of the procedure, plan, alternatives, and risks, and voiced consent to proceed.         PROCEDURE IN DETAIL:  The patient was brought into the Operating Room and placed in a supine position.  Appropriate surgical pause was taken to confirm the patient, using two patient identifiers, the site of the procedure, and the procedure to be performed. General anesthesia was induced. An arterial line was placed by the anesthesia team.  EEG and SSEP monitoring were set up and used throughout the case to monitor the patient's neurological function. Ultrasound was used to identify and naz the site of the right carotid bifurcation. Patient's right neck was then prepared and draped  in the usual sterile fashion.       First, a longitudinal skin incision was made overlying the anterior border of the right sternocleidomastoid.  The incision was deepened through the platysma using bovie electrocautery. We then continued our dissection along the medial border of the sternocleidomastoid, allowing this to be retracted laterally.  Deep to the sternocleidomastoid, we identified the internal jugular vein and this was dissected free along the medial border until the facial vein was encountered.  The facial vein was then transected and suture ligated using 3-0 silk suture.     Next, the right common carotid, internal carotid, external carotid, and superior thyroid arteries were identified, dissected free from surrounding tissue, and encircled with Vesseloops.  The vagus nerve was identified and preserved from harm.  The hypoglossal nerve was identified and preserved. 100 units/kg of heparin was administered intravenously.  After after an appropriate period of time to allow circulation of the heparin, and activated clotting time was checked and noted to be therapeutic.  Throughout the case, activated clotting time was checked periodically to ensure that the patient remained therapeutically anticoagulated.  At this point, the right internal carotid artery was clamped followed by the common carotid artery, and then the external carotid artery.  No SSEP or EEG changes were noted.  An arteriotomy was performed on the anterior surface of the distal common carotid artery using a scalpel and extended from the common carotid artery to the internal carotid artery using Noble scissors.     The endarterectomy plane was developed under the carotid plaque.  Proximally, the plaque was transected in the common carotid artery using Noble scissors.  In the distal  internal carotid artery, the plaque was feathered with a smooth end  point.  Eversion endarterectomy of the external carotid artery was performed and the carotid  plaque removed.  The endarterectomized  surface was irrigated with heparinized saline solution and all free debris was removed using forceps.     The distal endarterectomy endpoint was inspected and was satisfactory. Next, the patch angioplasty was performed using bovine pericardium.  The patch angioplasty was completed using running 6-0 Prolene suture.  Prior to completing the anastomosis, the carotid artery appropriately flushed and irrigated.  Flow was first re-established  into the external carotid artery followed by the internal carotid  artery.  Next, the suture line was checked for hemostasis and satisfactory.       Intraoperative doppler examination was then performed, and noted to be satisfactory. After ensuring hemostasis, the incision was closed using 2-0 Vicryl suture to reapproximate the platysma.  The skin was then reapproximated with running 4-0 Monocryl suture. The skin was then covered with dermabond.     The patient tolerated the procedure well.  When he recovered from general  anesthesia, he had no neurological deficits - his tongue was midline, voice was normal, and he was moving all extremities.  The patient was taken to the  postoperative area in stable condition.     I was physically present for the entire length of the case and scrubbed for the entire portions.      Burton Beltran MD, PhD.   Clinical   [facility] [employer]  Co-Director, Aortic Center  [facility] [place]

## 2025-01-30 NOTE — ANESTHESIA PROCEDURE NOTES
Arterial Line:    Date/Time: 1/30/2025 10:50 AM    Staffing  Performed: resident   Authorized by: Sindi Rodriguez MD    Performed by: Kim Sheriff MD    An arterial line was placed. Procedure performed using surface landmarks.in the OR for the following indication(s): continuous blood pressure monitoring and blood sampling needed.    A 20 gauge (size), 1 and 3/4 inch (length), Angiocath (type) catheter was placed into the Left radial artery, secured by Tegaderm,   Seldinger technique used.  Events:  patient tolerated procedure well with no complications.

## 2025-01-30 NOTE — SIGNIFICANT EVENT
Vascular Surgery Post-Operative Plan    S/p right CEA    Pre-op Vascular Exam: Grossly intact  Post-op Vascular Exam: tongue midline, moving all extremities    Plan:  4 hours PACU stay for NV checks  pain control  aspirin  diet as tolerated after 4 hours  Intermittent straight cath  24 hours Ancef for periop antibiotics  SQH for DVT Ppx  Wound: right neck incision with dermabond    Mj Echevarria MD  PGY-5 Vascular Surgery Resident

## 2025-01-30 NOTE — INTERVAL H&P NOTE
I have reviewed the patient's History and Physical Examination. I have personally seen and evaluated the patient, repeating key portions. There is no significant interval change.     Surgery is still indicated. Yes    Consent reviewed and signed by patient/family: Yes    Operative site verified and marked: Yes right neck    Procedure: Right carotid endarterectomy    Mj Echevarria MD  PGY-5 Vascular Surgery

## 2025-01-30 NOTE — SIGNIFICANT EVENT
S:   Fadi North is a 79 y.o. male with asymptomatic carotid artery stenosis now s/p R CEA on 1/30 with Dr. Beltran.     O:   Vital signs are stable, normotensive, afebrile, no new or worsening oxygen requirement, not tachycardic    Visit Vitals  /77   Pulse 78   Temp 36.3 °C (97.3 °F) (Temporal)   Resp 12        Constitutional: no acute distress  Skin: warm and dry overall   Neuro: A/O x4, CN II-XII grossly intact   HEENT: Atraumatic, no scleral icterus  Cardiac: RRR  Pulmonary: Unlabored respirations   Abdomen: Non distended, non tender  GI: Striaght catheterizes intermittently   Surgical Site: right neck incision, mild swelling, soft, appropriately tender, no hematoma  MARKELL, BLL extremities 5/5. Radial pulses palpable.     A/P:  4 hours PACU stay for NV checks  pain control  aspirin  diet as tolerated after 4 hours  Intermittent straight cath  24 hours Ancef for periop antibiotics  SQH for DVT Ppx  Wound: right neck incision with dermabond    Thaddeus Cleaning MD  PGY-1 General Surgery  Vascular Surgery

## 2025-01-30 NOTE — ANESTHESIA PREPROCEDURE EVALUATION
Patient: Fadi North    Procedure Information       Date/Time: 01/30/25 1040    Procedure: ENDARTERECTOMY, CAROTID (Right) - EEG/SSEP    Location: [facility] OR 16 / [facility] OR    Surgeons: Burton Beltran MD PhD            Relevant Problems   Cardiac   (+) Atherosclerotic heart disease of native coronary artery without angina pectoris   (+) Combined hyperlipidemia   (+) Hypercholesteremia      Neuro   (+) Carotid artery stenosis   (+) Carotid stenosis, asymptomatic, right   (+) More than 50 percent stenosis of right internal carotid artery      GI   (+) Gastroesophageal reflux disease   (+) Hiatal hernia      /Renal   (+) BPH (benign prostatic hyperplasia)   (+) Recurrent UTI      Musculoskeletal   (+) Primary osteoarthritis of left hip   (+) Primary osteoarthritis of right hip      ID   (+) Recurrent UTI       Clinical information reviewed:   Tobacco  Allergies  Meds   Med Hx  Surg Hx   Fam Hx  Soc Hx        NPO Detail:  NPO/Void Status  Date of Last Liquid: 01/30/25  Time of Last Liquid: 0700  Date of Last Solid: 01/29/25  Last Intake Type: -- (black coffee)         Physical Exam    Airway  Mallampati: III  TM distance: >3 FB  Neck ROM: full     Cardiovascular - normal exam     Dental - normal exam     Pulmonary - normal exam     Abdominal - normal exam             Anesthesia Plan    History of general anesthesia?: yes  History of complications of general anesthesia?: no    ASA 3     general   (GETA  A line )  intravenous induction   Postoperative administration of opioids is intended.  Trial extubation is planned.  Anesthetic plan and risks discussed with patient.  Use of blood products discussed with patient who consented to blood products.    Plan discussed with resident and attending.

## 2025-01-30 NOTE — ANESTHESIA POSTPROCEDURE EVALUATION
Patient: Fadi North    Procedure Summary       Date: 01/30/25 Room / Location: [facility] OR 16 / [facility] OR    Anesthesia Start: 1034 Anesthesia Stop: 1315    Procedure: ENDARTERECTOMY, CAROTID (Right) Diagnosis:       Carotid stenosis, asymptomatic, right      (Carotid stenosis, asymptomatic, right [I65.21])    Surgeons: Burton Beltran MD PhD Responsible Provider: Sindi Rodriguez MD    Anesthesia Type: general ASA Status: 3            Anesthesia Type: general    Vitals Value Taken Time   /77 01/30/25 1715   Temp 36.3 °C (97.3 °F) 01/30/25 1715   Pulse 80 01/30/25 1722   Resp 12 01/30/25 1715   SpO2 98 % 01/30/25 1722   Vitals shown include unfiled device data.    Anesthesia Post Evaluation    Patient location during evaluation: PACU  Patient participation: complete - patient participated  Level of consciousness: awake and alert  Pain management: adequate  Airway patency: patent  Cardiovascular status: acceptable  Respiratory status: acceptable  Hydration status: acceptable  Postoperative Nausea and Vomiting: none        No notable events documented.

## 2025-01-31 ENCOUNTER — TELEPHONE (OUTPATIENT)
Dept: VASCULAR SURGERY | Facility: HOSPITAL | Age: 80
End: 2025-01-31
Payer: COMMERCIAL

## 2025-01-31 VITALS
HEIGHT: 66 IN | TEMPERATURE: 98.6 F | OXYGEN SATURATION: 96 % | DIASTOLIC BLOOD PRESSURE: 69 MMHG | BODY MASS INDEX: 26.9 KG/M2 | WEIGHT: 167.4 LBS | SYSTOLIC BLOOD PRESSURE: 143 MMHG | HEART RATE: 75 BPM | RESPIRATION RATE: 15 BRPM

## 2025-01-31 LAB
ANION GAP SERPL CALC-SCNC: 14 MMOL/L (ref 10–20)
BUN SERPL-MCNC: 19 MG/DL (ref 6–23)
CALCIUM SERPL-MCNC: 9 MG/DL (ref 8.6–10.6)
CHLORIDE SERPL-SCNC: 104 MMOL/L (ref 98–107)
CO2 SERPL-SCNC: 25 MMOL/L (ref 21–32)
CREAT SERPL-MCNC: 1.26 MG/DL (ref 0.5–1.3)
EGFRCR SERPLBLD CKD-EPI 2021: 59 ML/MIN/1.73M*2
ERYTHROCYTE [DISTWIDTH] IN BLOOD BY AUTOMATED COUNT: 12.8 % (ref 11.5–14.5)
GLUCOSE SERPL-MCNC: 101 MG/DL (ref 74–99)
HCT VFR BLD AUTO: 44.2 % (ref 41–52)
HGB BLD-MCNC: 14.8 G/DL (ref 13.5–17.5)
MAGNESIUM SERPL-MCNC: 1.92 MG/DL (ref 1.6–2.4)
MCH RBC QN AUTO: 32.9 PG (ref 26–34)
MCHC RBC AUTO-ENTMCNC: 33.5 G/DL (ref 32–36)
MCV RBC AUTO: 98 FL (ref 80–100)
NRBC BLD-RTO: 0 /100 WBCS (ref 0–0)
PHOSPHATE SERPL-MCNC: 3.3 MG/DL (ref 2.5–4.9)
PLATELET # BLD AUTO: 266 X10*3/UL (ref 150–450)
POTASSIUM SERPL-SCNC: 3.9 MMOL/L (ref 3.5–5.3)
RBC # BLD AUTO: 4.5 X10*6/UL (ref 4.5–5.9)
SODIUM SERPL-SCNC: 139 MMOL/L (ref 136–145)
WBC # BLD AUTO: 12.9 X10*3/UL (ref 4.4–11.3)

## 2025-01-31 PROCEDURE — 2500000002 HC RX 250 W HCPCS SELF ADMINISTERED DRUGS (ALT 637 FOR MEDICARE OP, ALT 636 FOR OP/ED): Performed by: STUDENT IN AN ORGANIZED HEALTH CARE EDUCATION/TRAINING PROGRAM

## 2025-01-31 PROCEDURE — 83735 ASSAY OF MAGNESIUM: CPT | Performed by: STUDENT IN AN ORGANIZED HEALTH CARE EDUCATION/TRAINING PROGRAM

## 2025-01-31 PROCEDURE — 82374 ASSAY BLOOD CARBON DIOXIDE: CPT | Performed by: STUDENT IN AN ORGANIZED HEALTH CARE EDUCATION/TRAINING PROGRAM

## 2025-01-31 PROCEDURE — 2500000001 HC RX 250 WO HCPCS SELF ADMINISTERED DRUGS (ALT 637 FOR MEDICARE OP): Performed by: STUDENT IN AN ORGANIZED HEALTH CARE EDUCATION/TRAINING PROGRAM

## 2025-01-31 PROCEDURE — 36415 COLL VENOUS BLD VENIPUNCTURE: CPT | Performed by: STUDENT IN AN ORGANIZED HEALTH CARE EDUCATION/TRAINING PROGRAM

## 2025-01-31 PROCEDURE — 85027 COMPLETE CBC AUTOMATED: CPT | Performed by: STUDENT IN AN ORGANIZED HEALTH CARE EDUCATION/TRAINING PROGRAM

## 2025-01-31 PROCEDURE — 84100 ASSAY OF PHOSPHORUS: CPT | Performed by: STUDENT IN AN ORGANIZED HEALTH CARE EDUCATION/TRAINING PROGRAM

## 2025-01-31 PROCEDURE — 2500000004 HC RX 250 GENERAL PHARMACY W/ HCPCS (ALT 636 FOR OP/ED): Performed by: STUDENT IN AN ORGANIZED HEALTH CARE EDUCATION/TRAINING PROGRAM

## 2025-01-31 RX ORDER — ACETAMINOPHEN 325 MG/1
650 TABLET ORAL EVERY 6 HOURS PRN
Start: 2025-01-31

## 2025-01-31 RX ORDER — OXYCODONE HYDROCHLORIDE 5 MG/1
5 TABLET ORAL EVERY 6 HOURS PRN
Qty: 12 TABLET | Refills: 0 | Status: SHIPPED | OUTPATIENT
Start: 2025-01-31 | End: 2025-02-03

## 2025-01-31 RX ORDER — DOCUSATE SODIUM 100 MG/1
100 CAPSULE, LIQUID FILLED ORAL 2 TIMES DAILY PRN
Start: 2025-01-31

## 2025-01-31 RX ORDER — ASPIRIN 81 MG/1
81 TABLET ORAL DAILY
Qty: 90 TABLET | Refills: 3 | Status: SHIPPED | OUTPATIENT
Start: 2025-01-31

## 2025-01-31 RX ADMIN — OXYCODONE HYDROCHLORIDE 5 MG: 5 TABLET ORAL at 10:16

## 2025-01-31 RX ADMIN — HEPARIN SODIUM 5000 UNITS: 5000 INJECTION, SOLUTION INTRAVENOUS; SUBCUTANEOUS at 02:17

## 2025-01-31 RX ADMIN — HEPARIN SODIUM 5000 UNITS: 5000 INJECTION, SOLUTION INTRAVENOUS; SUBCUTANEOUS at 09:30

## 2025-01-31 RX ADMIN — OXYCODONE HYDROCHLORIDE 5 MG: 5 TABLET ORAL at 05:02

## 2025-01-31 RX ADMIN — ASPIRIN 81 MG: 81 TABLET, COATED ORAL at 08:44

## 2025-01-31 RX ADMIN — ACETAMINOPHEN 650 MG: 325 TABLET ORAL at 05:02

## 2025-01-31 RX ADMIN — ROSUVASTATIN CALCIUM 20 MG: 20 TABLET, FILM COATED ORAL at 08:44

## 2025-01-31 RX ADMIN — CEFAZOLIN SODIUM 2 G: 2 INJECTION, SOLUTION INTRAVENOUS at 04:29

## 2025-01-31 RX ADMIN — PANTOPRAZOLE SODIUM 40 MG: 40 TABLET, DELAYED RELEASE ORAL at 06:24

## 2025-01-31 ASSESSMENT — PAIN DESCRIPTION - DESCRIPTORS: DESCRIPTORS: ACHING;DISCOMFORT

## 2025-01-31 ASSESSMENT — PAIN SCALES - GENERAL: PAINLEVEL_OUTOF10: 7

## 2025-01-31 ASSESSMENT — PAIN - FUNCTIONAL ASSESSMENT: PAIN_FUNCTIONAL_ASSESSMENT: 0-10

## 2025-01-31 NOTE — TELEPHONE ENCOUNTER
Pt s/p R CEA yesterday with Dr. Beltran, discharged this AM after being seen by Dr. Beltran.   Called office with c/o neck swelling, difficulty swallowing and neck pain. Patient states this is not any different from earlier this morning while hospitalized.   Denies difficulty breathing, drainage or headaches.   I advised ED evaluation.   Patient declined.   Offered patient office visit with Dr. Beltran this coming Monday (2/3/25), he will think about it and let us know.   Discussed need for ED eval over the weekend if symptoms worsen.     Beena Sanchez, APRN-CNP

## 2025-01-31 NOTE — PROGRESS NOTES
"   01/30/25 1812   Discharge Planning   Living Arrangements Spouse/significant other   Support Systems Spouse/significant other   Assistance Needed \"No\"   Type of Residence Private residence   Home or Post Acute Services None   Expected Discharge Disposition Home   Financial Resource Strain   How hard is it for you to pay for the very basics like food, housing, medical care, and heating? Not hard   Housing Stability   In the last 12 months, was there a time when you were not able to pay the mortgage or rent on time? N   In the past 12 months, how many times have you moved where you were living? 1   At any time in the past 12 months, were you homeless or living in a shelter (including now)? N   Transportation Needs   In the past 12 months, has lack of transportation kept you from medical appointments or from getting medications? no   In the past 12 months, has lack of transportation kept you from meetings, work, or from getting things needed for daily living? No     Transitional Care Coordination Progress Note:  Patient discussed during interdisciplinary rounds.   Team members present:   Plan per Medical/Surgical team: Fadi North is a 79 y.o. male with hx of CAD, HLD, asymptomatic carotid artery stenosis. Duplex US from 11/18/24 demonstrated >70% right ICA stenosis, <50% left ICA stenosis. CTA head and neck from 1/7/25 with focal 70-80% right ICA stenosis, 15-20% left carotid stenosis. On 1/30/25   Payor: [employer]  Discharge disposition:  home, self care  Potential Barriers:  n/a  ADOD:   2/1  Previous Home Care:  n/a  DME:  n/a  Pharmacy:  [facility]  PCP:  tameka angulo  Dialysis:  n/a  " Outpatient Occupational Therapy Peds Treatment Note      Patient Name: Yousuf Lambert  : 2017  MRN: [id number]  Today's Date: 2022       Visit Date: 2022    Visit Dx:    ICD-10-CM ICD-9-CM   1. Delayed milestones  R62.0 783.42   2. Other lack of coordination  R27.8 781.3   3. Decreased motor strength  M62.81 780.79   4. Autism  F84.0 299.00     Occupational Therapy Daily Progress Note      Patient: Yousuf Lambert   : 2017  Diagnosis/ICD-10 Code:  Delayed milestones [R62.0]  Referring practitioner: Chhaya Brandon MD  Date of Initial Visit: Type: THERAPY  Noted: 2021  Today's Date: 2022  Patient seen for 43 sessions             Subjective : Corey's dad accompanied him to his visit this date. Corey engaged in increased eye contact with therapist throughout session this date.         Objective :   Pt completed:  Therapeutic Activities:                               - Obstacle course tasks with quadruped climb up ramp with therapist facilitation of positioning, jump to crash pad surface, 2 footed jump forward with bilateral handheld assistance, navigation of low beam and stepping stones with focus on completing in tandem step, step up onto 9 inch step stool, jump down, and sustained tailor sit on scooter board with linear acceleration down ramp and visual attention to far target.      -Seated task in tailor sit on unsteady surface of onesimo disc as well as stabilized inclined platform swing with therapist facilitation for postural activation with reach to game surface.       -Fine motor work with medium strength theraputty with push, pull, squeeze, and pincer grasp to remove and place 1 inch items in putty.                  -Fine motor work with sustained lateral pinch and pincer grasp for push against resistance to place game pieces into targeted opening on game container. Required therapist assistance for awareness of second hand to stabilize game container for  placement of pieces.     -Fine motor work with sustained grasp on small game dowel with hook to suspend for retrieval of targeted game pieces.     -Use of sequence strip throughout session for increased awareness of order of activities, communication to indicate activities, and completion of tasks with maximum cueing for placement of pictures and seeking of matching task.     Neuromuscular Re-Education:    -Vestibular activation in net swing with varied movement including linear, rotary, and rapid directional shifts with proprioceptive bump for increased awareness of position in space.    -Balance challenge in stand on unsteady surface of thick foam mat with visual attention to suspended ball swing for attempts at timed hit of ball in linear moving in linear and rotary pattern.                             Assessment/Plan:  Good visual attention to suspended ball swing this date with accuracy with timed hit of moving ball in 75% of opportunities. Moderate LOB in stand on unsteady surface of thick foam mat, but is increasing attempts at sustaining throughout play task. Continues to require maximum facilitation for postural activation in tailor sit position. Decreased seeking of support with navigation during balance challenges and step up onto surfaces. Independent with step up onto step stool this date. Skilled therapeutic service is required for safe and effective provision of activities for improved gross motor coordination and balance for navigating his environment, fine motor coordination, awareness of position in space, vestibular processing, body awareness, and possible processing of auditory information for increased independence with age level play skills and social interactions.  Continue plan of care.        Therapeutic Activity:    38        mins  [id number];    Neuromuscular Re-education:        20   mins 84201  Timed Treatment:   58   mins   Total Treatment:     58   mins      Today's treatment provided  by:      Elly Peak, OTR/L 9/8/2022   [place] License #: [id number]    Electronically signed

## 2025-01-31 NOTE — DISCHARGE SUMMARY
Discharge Diagnosis  Carotid stenosis, asymptomatic, right    Test Results Pending At Discharge  Pending Labs       Order Current Status    Basic Metabolic Panel In process    CBC In process    Magnesium In process    Phosphorus In process    Surgical Pathology Exam In process            Hospital Course   Fadi North is a 79 y.o. male with hx of CAD, HLD, asymptomatic carotid artery stenosis. Duplex US from 11/18/24 demonstrated >70% right ICA stenosis, <50% left ICA stenosis. CTA head and neck from 1/7/25 with focal 70-80% right ICA stenosis, 15-20% left carotid stenosis. On 1/30/25 he underwent right carotid endarterectomy with Dr. Beltran. In the post op period he remained neurologically intact, stable VS, pain controlled, tolerated diet. He was doscjarged home on POD #1 with Rx for aspirin and oxycodone. He will follow up in clinic with  and repeat carotid duplex US.    Pertinent Physical Exam At Time of Discharge  Physical Exam  Constitutional: No acute distress, resting comfortably  Neuro:  AOx3, grossly intact  ENMT: moist mucous membranes, right neck soft, no hematoma, incision swell approximated with skin glue  CV: no tachycardia  Pulm: non-labored on RA  GI: soft, non-tender, non-distended  Skin: warm and dry  Musculoskeletal: moving all extremities  Extremities: warm and perfused    Home Medications     Medication List      START taking these medications     acetaminophen 325 mg tablet; Commonly known as: Tylenol; Take 2 tablets   (650 mg) by mouth every 6 hours if needed for mild pain (1 - 3) or   moderate pain (4 - 6).   docusate sodium 100 mg capsule; Commonly known as: Colace; Take 1   capsule (100 mg) by mouth 2 times a day as needed for constipation.   oxyCODONE 5 mg immediate release tablet; Commonly known as: Roxicodone;   Take 1 tablet (5 mg) by mouth every 6 hours if needed for severe pain (7 -   10) for up to 3 days.     CONTINUE taking these medications     aspirin 81 mg EC tablet;  Take 1 tablet (81 mg) by mouth once daily.   omeprazole 40 mg DR capsule; Commonly known as: PriLOSEC; Take 1 capsule   (40 mg) by mouth once daily.   rosuvastatin 20 mg tablet; Commonly known as: Crestor; Take 1 tablet (20   mg) by mouth once daily.       Outpatient Follow-Up  No future appointments.    Lena Short, APRN-CNP

## 2025-01-31 NOTE — PROGRESS NOTES
Physical Therapy                 Therapy Communication Note    Patient Name: Fadi North  MRN: [id number]  Department: [place] [address]  Room[facility]  Today's Date: 1/31/2025     Discipline: Physical Therapy        PT SCREEN. pt seen up independently in room. Spoke with pt and he has no mobility concerns at this time and able to mobilize independently. no PT concerns at this time. will DC orders.      01/31/25 at 10:23 AM - Tiana Langford, PT

## 2025-01-31 NOTE — CARE PLAN
"The patient's goals for the shift include      The clinical goals for the shift include \"to get some rest\"    Problem: Pain - Adult  Goal: Verbalizes/displays adequate comfort level or baseline comfort level  Outcome: Progressing     Problem: Safety - Adult  Goal: Free from fall injury  Outcome: Progressing     Problem: Discharge Planning  Goal: Discharge to home or other facility with appropriate resources  Outcome: Progressing     Problem: Chronic Conditions and Co-morbidities  Goal: Patient's chronic conditions and co-morbidity symptoms are monitored and maintained or improved  Outcome: Progressing     Problem: Nutrition  Goal: Nutrient intake appropriate for maintaining nutritional needs  Outcome: Progressing     Problem: Pain  Goal: Takes deep breaths with improved pain control throughout the shift  Outcome: Progressing  Goal: Turns in bed with improved pain control throughout the shift  Outcome: Progressing  Goal: Walks with improved pain control throughout the shift  Outcome: Progressing  Goal: Performs ADL's with improved pain control throughout shift  Outcome: Progressing  Goal: Participates in PT with improved pain control throughout the shift  Outcome: Progressing  Goal: Free from opioid side effects throughout the shift  Outcome: Progressing  Goal: Free from acute confusion related to pain meds throughout the shift  Outcome: Progressing     Problem: Skin  Goal: Decreased wound size/increased tissue granulation at next dressing change  Outcome: Progressing  Goal: Participates in plan/prevention/treatment measures  Outcome: Progressing  Goal: Prevent/manage excess moisture  Outcome: Progressing  Goal: Prevent/minimize sheer/friction injuries  Outcome: Progressing  Goal: Promote/optimize nutrition  Outcome: Progressing  Goal: Promote skin healing  Outcome: Progressing     "

## 2025-02-10 LAB
LABORATORY COMMENT REPORT: NORMAL
PATH REPORT.FINAL DX SPEC: NORMAL
PATH REPORT.GROSS SPEC: NORMAL
PATH REPORT.RELEVANT HX SPEC: NORMAL
PATH REPORT.TOTAL CANCER: NORMAL

## 2025-02-18 ENCOUNTER — TELEPHONE (OUTPATIENT)
Dept: VASCULAR SURGERY | Facility: HOSPITAL | Age: 80
End: 2025-02-18

## 2025-02-26 ENCOUNTER — HOSPITAL ENCOUNTER (OUTPATIENT)
Dept: VASCULAR MEDICINE | Facility: HOSPITAL | Age: 80
Discharge: HOME | End: 2025-02-26
Payer: COMMERCIAL

## 2025-02-26 ENCOUNTER — APPOINTMENT (OUTPATIENT)
Dept: VASCULAR MEDICINE | Facility: HOSPITAL | Age: 80
End: 2025-02-26
Payer: COMMERCIAL

## 2025-02-26 ENCOUNTER — OFFICE VISIT (OUTPATIENT)
Dept: VASCULAR SURGERY | Facility: HOSPITAL | Age: 80
End: 2025-02-26
Payer: COMMERCIAL

## 2025-02-26 VITALS
HEART RATE: 101 BPM | WEIGHT: 170 LBS | SYSTOLIC BLOOD PRESSURE: 138 MMHG | BODY MASS INDEX: 27.32 KG/M2 | RESPIRATION RATE: 18 BRPM | OXYGEN SATURATION: 96 % | DIASTOLIC BLOOD PRESSURE: 83 MMHG | HEIGHT: 66 IN

## 2025-02-26 DIAGNOSIS — I65.21 CAROTID STENOSIS, ASYMPTOMATIC, RIGHT: Primary | ICD-10-CM

## 2025-02-26 DIAGNOSIS — I65.21 ASYMPTOMATIC STENOSIS OF RIGHT CAROTID ARTERY: ICD-10-CM

## 2025-02-26 DIAGNOSIS — I65.23 OCCLUSION AND STENOSIS OF BILATERAL CAROTID ARTERIES: ICD-10-CM

## 2025-02-26 PROCEDURE — 93880 EXTRACRANIAL BILAT STUDY: CPT

## 2025-02-26 PROCEDURE — 99211 OFF/OP EST MAY X REQ PHY/QHP: CPT | Performed by: SURGERY

## 2025-02-26 PROCEDURE — 1111F DSCHRG MED/CURRENT MED MERGE: CPT | Performed by: SURGERY

## 2025-02-26 PROCEDURE — 1126F AMNT PAIN NOTED NONE PRSNT: CPT | Performed by: SURGERY

## 2025-02-26 PROCEDURE — 1159F MED LIST DOCD IN RCRD: CPT | Performed by: SURGERY

## 2025-02-26 PROCEDURE — 93880 EXTRACRANIAL BILAT STUDY: CPT | Performed by: SURGERY

## 2025-02-26 ASSESSMENT — ENCOUNTER SYMPTOMS
DEPRESSION: 0
OCCASIONAL FEELINGS OF UNSTEADINESS: 0
LOSS OF SENSATION IN FEET: 0

## 2025-02-26 ASSESSMENT — PATIENT HEALTH QUESTIONNAIRE - PHQ9
2. FEELING DOWN, DEPRESSED OR HOPELESS: NOT AT ALL
1. LITTLE INTEREST OR PLEASURE IN DOING THINGS: NOT AT ALL
SUM OF ALL RESPONSES TO PHQ9 QUESTIONS 1 AND 2: 0

## 2025-02-26 ASSESSMENT — COLUMBIA-SUICIDE SEVERITY RATING SCALE - C-SSRS
2. HAVE YOU ACTUALLY HAD ANY THOUGHTS OF KILLING YOURSELF?: NO
6. HAVE YOU EVER DONE ANYTHING, STARTED TO DO ANYTHING, OR PREPARED TO DO ANYTHING TO END YOUR LIFE?: NO
1. IN THE PAST MONTH, HAVE YOU WISHED YOU WERE DEAD OR WISHED YOU COULD GO TO SLEEP AND NOT WAKE UP?: NO

## 2025-02-26 ASSESSMENT — PAIN SCALES - GENERAL: PAINLEVEL_OUTOF10: 0-NO PAIN

## 2025-02-26 NOTE — PROGRESS NOTES
Vascular Surgery Consult/Clinic Note    CC: Follow up    HPI:  Fadi North is 79 y.o. male with history of CAD s/p R CEA on 1/30/2025.   Denies any neurological symptoms or headache.   He is here today for 1 month follow up with carotid ultrasound.         Meds:   Current Outpatient Medications on File Prior to Visit   Medication Sig Dispense Refill    acetaminophen (Tylenol) 325 mg tablet Take 2 tablets (650 mg) by mouth every 6 hours if needed for mild pain (1 - 3) or moderate pain (4 - 6).      aspirin 81 mg EC tablet Take 1 tablet (81 mg) by mouth once daily. (Patient taking differently: Take 1 tablet (81 mg) by mouth once daily. PT STATES HE TAKES EVERY OTHER DAY) 90 tablet 3    omeprazole (PriLOSEC) 40 mg DR capsule Take 1 capsule (40 mg) by mouth once daily. 90 capsule 1    rosuvastatin (Crestor) 20 mg tablet Take 1 tablet (20 mg) by mouth once daily. 30 tablet 11    docusate sodium (Colace) 100 mg capsule Take 1 capsule (100 mg) by mouth 2 times a day as needed for constipation. (Patient not taking: Reported on 2/26/2025)       No current facility-administered medications on file prior to visit.        Allergies:   Allergies   Allergen Reactions    Penicillins Unknown       SH:    Social Drivers of Health     Tobacco Use: Low Risk  (1/30/2025)    Patient History     Smoking Tobacco Use: Never     Smokeless Tobacco Use: Never     Passive Exposure: Never   Alcohol Use: Alcohol Misuse (1/30/2025)    AUDIT-C     Frequency of Alcohol Consumption: 4 or more times a week     Average Number of Drinks: 1 or 2     Frequency of Binge Drinking: Less than monthly   Financial Resource Strain: Low Risk  (1/30/2025)    Overall Financial Resource Strain (CARDIA)     Difficulty of Paying Living Expenses: Not hard at all   Food Insecurity: No Food Insecurity (1/30/2025)    Hunger Vital Sign     Worried About Running Out of Food in the Last Year: Never true     Ran Out of Food in the Last Year: Never true   Transportation  Needs: No Transportation Needs (1/30/2025)    PRAPARE - Transportation     Lack of Transportation (Medical): No     Lack of Transportation (Non-Medical): No   Physical Activity: Not on file   Stress: Not on file   Social Connections: Not on file   Intimate Partner Violence: Not At Risk (1/30/2025)    Humiliation, Afraid, Rape, and Kick questionnaire     Fear of Current or Ex-Partner: No     Emotionally Abused: No     Physically Abused: No     Sexually Abused: No   Depression: Not at risk (2/26/2025)    PHQ-2     PHQ-2 Score: 0   Housing Stability: Low Risk  (1/30/2025)    Housing Stability Vital Sign     Unable to Pay for Housing in the Last Year: No     Number of Times Moved in the Last Year: 1     Homeless in the Last Year: No   Utilities: Not At Risk (1/30/2025)    [facility] Utilities     Threatened with loss of utilities: No   Digital Equity: Not on file   Health Literacy: Not on file        FH:  Family History   Problem Relation Name Age of Onset    No Known Problems Mother      Colon cancer Father      Other (COLORECTAL CANCER) Father      Breast cancer Sister      Genetic Disease Carrier Sister      Other (MONOALLELIC MUTATION OF DENY GENE) Sister      Breast cancer Father's Sister            Objective:  Vitals:  Vitals:    02/26/25 1342   BP: 138/83   Pulse:    Resp:    SpO2:         Exam:  Gen: in NAD  GI: Soft, ND/NT  Ext:  BUE/BLE with 5/5 motor str.   CN II-XII grossly intact.     Imaging:  Carotid artery duplex (2/26/2025) independently reviewed.   B ICA patent without flow limiting stenosis.     Carotid artery duplex (11/18/2024) independently reviewed.   R ICA with >70% stenosis.   L ICA patent without flow limiting stenosis.     Assessment & Plan:  Fadi North is 79 y.o. male s/p R CEA.    - Cont. ASA and statin therapy.    - Follow up in 6 months with carotid duplex for surveillance.       Burton Beltran MD, PhD  Clinical   [facility] [employer]  Co-Director, Aortic Center  [facility] [place]

## 2025-04-28 ENCOUNTER — OFFICE VISIT (OUTPATIENT)
Dept: URGENT CARE | Age: 80
End: 2025-04-28
Payer: COMMERCIAL

## 2025-04-28 VITALS
SYSTOLIC BLOOD PRESSURE: 147 MMHG | TEMPERATURE: 98 F | OXYGEN SATURATION: 98 % | DIASTOLIC BLOOD PRESSURE: 95 MMHG | RESPIRATION RATE: 24 BRPM | HEART RATE: 106 BPM

## 2025-04-28 DIAGNOSIS — N30.01 ACUTE CYSTITIS WITH HEMATURIA: Primary | ICD-10-CM

## 2025-04-28 LAB
POC APPEARANCE, URINE: ABNORMAL
POC BILIRUBIN, URINE: NEGATIVE
POC BLOOD, URINE: ABNORMAL
POC COLOR, URINE: ABNORMAL
POC GLUCOSE, URINE: NEGATIVE MG/DL
POC KETONES, URINE: NEGATIVE MG/DL
POC LEUKOCYTES, URINE: ABNORMAL
POC NITRITE,URINE: NEGATIVE
POC PH, URINE: 5.5 PH
POC PROTEIN, URINE: ABNORMAL MG/DL
POC SPECIFIC GRAVITY, URINE: 1.02
POC UROBILINOGEN, URINE: 0.2 EU/DL

## 2025-04-28 PROCEDURE — 1159F MED LIST DOCD IN RCRD: CPT | Performed by: NURSE PRACTITIONER

## 2025-04-28 PROCEDURE — 81003 URINALYSIS AUTO W/O SCOPE: CPT | Performed by: NURSE PRACTITIONER

## 2025-04-28 PROCEDURE — 99214 OFFICE O/P EST MOD 30 MIN: CPT | Performed by: NURSE PRACTITIONER

## 2025-04-28 RX ORDER — CEPHALEXIN 500 MG/1
500 CAPSULE ORAL 2 TIMES DAILY
Qty: 20 CAPSULE | Refills: 0 | Status: SHIPPED | OUTPATIENT
Start: 2025-04-28 | End: 2025-05-08

## 2025-04-28 ASSESSMENT — ENCOUNTER SYMPTOMS
FEVER: 0
NAUSEA: 0
DIARRHEA: 0
VOMITING: 0
ABDOMINAL PAIN: 1
FREQUENCY: 0
FLANK PAIN: 0
DYSURIA: 1
HEMATURIA: 1

## 2025-04-28 NOTE — PROGRESS NOTES
Subjective   Patient ID: Fadi North is a 79 y.o. male. They present today with a chief complaint of Difficulty Urinating (Cloudy, blood tinged urine on/off for a month. Self cath. Using Cipro to self treat).    History of Present Illness  Patient has a history of urinary retention and has been using catheter to self cath for several years.  He is prone to urinary tract infection and has a standing order for Cipro to be taken twice a day for 10 days.  Patient took 1 dose of Cipro earlier but his UTI symptoms did not improve.  Patient believes that Cipro has lost it efficacy because he has been taking it for many years.  Patient requests for a different antibiotic.  In the past he has taken Macrobid and cephalexin without experiencing adverse effects.  Patient denies kidney and liver problems.  Patient denies penile discharge, scrotal pain and swelling and penile pain.  Patient will see his urologist in 3 days.      History provided by:  Patient   used: No    Difficulty Urinating  Presenting symptoms: dysuria    Context comment:  Consistent dysuria  Relieved by:  Nothing  Exacerbated by: Catheterization.  Ineffective treatments: Cipro.  Associated symptoms: abdominal pain and hematuria    Associated symptoms: no diarrhea, no fever, no flank pain, no genital itching, no genital lesions, no genital rash, no groin pain, no nausea, no penile redness, no penile swelling, no priapism, no scrotal swelling, no urinary frequency, no urinary hesitation, no urinary incontinence, no urinary retention and no vomiting    Associated symptoms comment:  Cloudy urine      Past Medical History  Allergies as of 04/28/2025 - Reviewed 04/28/2025   Allergen Reaction Noted    Penicillins Unknown 07/18/2018       Prescriptions Prior to Admission[1]     Medical History[2]    Surgical History[3]     reports that he has never smoked. He has never been exposed to tobacco smoke. He has never used smokeless tobacco. He  reports current alcohol use.    Review of Systems  Review of Systems   Constitutional:  Negative for fever.   Gastrointestinal:  Positive for abdominal pain. Negative for diarrhea, nausea and vomiting.   Genitourinary:  Positive for dysuria and hematuria. Negative for bladder incontinence, flank pain, frequency, hesitancy, penile swelling and scrotal swelling.         Objective    Vitals:    04/28/25 1329   BP: (!) 147/95   Pulse: 106   Resp: 24   Temp: 36.7 °C (98 °F)   SpO2: 98%     No LMP for male patient.    Physical Exam  Vitals and nursing note reviewed.   Constitutional:       Appearance: Normal appearance.   HENT:      Head: Normocephalic and atraumatic.   Cardiovascular:      Rate and Rhythm: Normal rate and regular rhythm.   Pulmonary:      Effort: Pulmonary effort is normal.      Breath sounds: Normal breath sounds.   Abdominal:      General: Abdomen is protuberant.      Palpations: There is no mass.      Tenderness: There is abdominal tenderness in the suprapubic area. There is no right CVA tenderness or left CVA tenderness.      Hernia: No hernia is present.   Neurological:      Mental Status: He is alert.         Procedures    Point of Care Test & Imaging Results from this visit  Results for orders placed or performed in visit on 04/28/25   POCT UA Automated manually resulted   Result Value Ref Range    POC Color, Urine Red-brown (A) Straw, Yellow, Light-Yellow    POC Appearance, Urine Cloudy (A) Clear    POC Glucose, Urine NEGATIVE NEGATIVE mg/dl    POC Bilirubin, Urine NEGATIVE NEGATIVE    POC Ketones, Urine NEGATIVE NEGATIVE mg/dl    POC Specific Gravity, Urine 1.025 1.005 - 1.035    POC Blood, Urine LARGE (3+) (A) NEGATIVE    POC PH, Urine 5.5 No Reference Range Established PH    POC Protein, Urine 100 (2+) (A) NEGATIVE mg/dl    POC Urobilinogen, Urine 0.2 0.2, 1.0 EU/DL    Poc Nitrite, Urine NEGATIVE NEGATIVE    POC Leukocytes, Urine LARGE (3+) (A) NEGATIVE      Imaging  No results  found.    Cardiology, Vascular, and Other Imaging  No other imaging results found for the past 2 days      Diagnostic study results (if any) were reviewed by YAMILEX Kelly.    Assessment/Plan   Allergies, medications, history, and pertinent labs/EKGs/Imaging reviewed by YAMILEX Kelly.     Medical Decision Making  Urine specimen was sent out for culture and sensitivity. Will adjust the antibiotic treatment as needed.   Take the antibiotic with food.  Eat yogurt or take probiotic once a day.  Symptoms should improve in 2 to 3 days.   --- Drink a lot of fluid, 3 to 4 quarts a day. Cranberry juice is especially recommended, in addition to large amounts of water.  --- Avoid alcohol caffeine, tea and carbonated beverages (Coke®, 7-Up®, etc). They tend to irritate the bladder.  --- Ibuprofen (Advil® or Motrin®) or acetaminophen (Tylenol®) may be used for temperature and/or discomfort.  To prevent severe infection, follow up immediately if you:  --- Develop severe back pain or lower abdominal pain.  --- Develop chills and fever.  --- Develop nausea or vomiting.  --- Have continued burning or discomfort with urination.  Patient will follow-up with his urologist in 3 days.  Pt verbalized understanding of the instructions and left in stable condition.      Orders and Diagnoses  Diagnoses and all orders for this visit:  Acute cystitis with hematuria  -     Urine Culture  -     POCT UA Automated manually resulted  -     cephalexin (Keflex) 500 mg capsule; Take 1 capsule (500 mg) by mouth 2 times a day for 10 days.      Medical Admin Record      Patient disposition: Home    Electronically signed by YAMILEX Kelly  1:48 PM           [1] (Not in a hospital admission)   [2]   Past Medical History:  Diagnosis Date    Acute bacterial sinusitis 11/16/2023    Acute frontal sinusitis, unspecified 01/08/2018    Acute frontal sinusitis    Acute serous otitis media, bilateral 08/07/2018    Bilateral acute serous  otitis media, recurrence not specified    Acute upper respiratory infection, unspecified 01/11/2019    Acute upper respiratory infection    Candidiasis of skin and nail 12/03/2018    Candidal dermatitis    Chest discomfort 11/20/2024    Contact dermatitis 11/16/2023    Dermatitis, 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    Dermatitis, eczematoid    Disorder of the skin and subcutaneous tissue, unspecified 12/31/2018    Skin lesion    Fracture of unspecified phalanx of unspecified finger, initial encounter for closed fracture 03/26/2013    Closed fracture of one or more phalanges of hand    Gastro-esophageal reflux disease without esophagitis 06/30/2013    Esophageal reflux    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    Glossodynia    Headache, 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    Headache    Headache, unspecified 01/15/2018    Frontal headache    Hypercholesteremia 11/20/2024    Malignant neoplasm of tongue, 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    More than 50 percent stenosis of right internal carotid artery 11/20/2024    Nonspecific lymphadenitis, 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    Lymphadenitis    Other conditions influencing health status 03/26/2013    Foot pain, unspecified laterality    Other conditions influencing health status 01/11/2019    History of cough    Other conditions influencing health status 06/30/2013    Anomalies Of The Tongue    Other diseases of stomach and duodenum 04/15/2021    Submucosal lesion of stomach    Other specified hypothyroidism 03/26/2013    Secondary hypothyroidism    Pain in throat 06/02/2016    Throat pain    Palpitations 11/20/2024    Personal history of diseases of the skin and subcutaneous tissue 06/11/2018    History of actinic keratosis    Personal history of diseases of the skin and subcutaneous tissue 12/17/2018    History of seborrheic keratosis    Personal history of other diseases of the respiratory system 08/07/2018    History of sore throat    Personal history of other specified conditions 06/11/2018     History of polyuria    PONV (postoperative nausea and vomiting)     Primary osteoarthritis, unspecified hand 03/26/2013    Osteoarthritis, hand    Tongue pain 11/16/2023    Urinary tract infection, site not specified 03/26/2013    Urinary tract infection   [3]   Past Surgical History:  Procedure Laterality Date    BLADDER SURGERY  09/03/2015    Bladder Surgery    OTHER SURGICAL HISTORY  09/03/2015    Biopsy Tongue    TOTAL HIP ARTHROPLASTY      right one was 2021 left was done 2022

## 2025-04-29 NOTE — PROGRESS NOTES
Subjective   Patient ID: Fadi North is a 79 y.o. male who presents for EVALUATION OF REC UTI'S SECONDARY TO DOING  ISC.  PT DOES ISC 4-5 X / DAY -- PT HAS A NEUROGENIC BLADDER. PT USUALLY HAS CIPRO ON HAND TO USE IF A UTI DEVELOPS.  NO H/O PROSTATE CANCER IN THE FAMILY. PT WAS NOT IN [facility].  PT  IS HAVING BURNING WHEN HE PASSES A CATHETER AND HAS OCC GROSS HEMATURIA.    ROS:  General-- No C/O fever or chills  Head-- No C/O Dizziness  Eyes-- NO  C/O blurry --PT HAS  double vision  Ears-- No C/O hearing loss  Neck-- Supple  Chest-- No C/O pain or discomfort  Lungs-- No C/O shortness of breath  Abdomen-- No C/O  pain or discomfort, No nausea or vomiting  Back-- PT  C/O back pain   Extremities-- No C/O swelling or pain    OBJECTIVE  General-- well-developed, well-nourished in NAD  Head-- normal cephalic, atraumatic  Eyes-- PERRL, EOM'S FROM, no jaundice  Neck-- Supple, without masses  Chest-- Normal bony structure  Abdomen-- soft, non tender, liver spleen not palpable. No suprapubic masses.  RECTUS DIASTASES, UMBILICAL HERNIA  Back-- no flank masses palpable, no CVA tenderness on palpation or percussion  Lymph nodes-- No inguinal lymphadenopathy noted  Prostate-- 2+, firm, smooth, non-tender, without nodules  Testis-- both down, non-tender, without masses  Epididymis-- no masses palpable  Scrotum -- no hydrocele noted  Extremities -- Normal muscle mass and tone for the patients age  Neurological-- oriented times three    URINALYSIS DIPSTICK-- 2+ PROTEIN, LARGE HEMOGLOBINURIA, 3+ LEUKOCYTES -- COLOR IS RED-BROWN    4-28-25  URINE C & S -- NO GROWTH  ASSESSMENT / PLAN  A:  LONG H/O A MOTOR NEUROGENIC BLADDER  REMOTE H/O A BLADDER DIVERTICULECTOMY  REMOTE H/O BLADDER STONES   PERSISTENT HEMATURIA ? 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

## 2025-04-30 LAB — BACTERIA UR CULT: NORMAL

## 2025-05-01 ENCOUNTER — APPOINTMENT (OUTPATIENT)
Dept: UROLOGY | Facility: CLINIC | Age: 80
End: 2025-05-01
Payer: COMMERCIAL

## 2025-05-01 VITALS
BODY MASS INDEX: 26.03 KG/M2 | DIASTOLIC BLOOD PRESSURE: 97 MMHG | WEIGHT: 162 LBS | HEART RATE: 86 BPM | HEIGHT: 66 IN | SYSTOLIC BLOOD PRESSURE: 160 MMHG

## 2025-05-01 DIAGNOSIS — N30.90 RECURRENT CYSTITIS: ICD-10-CM

## 2025-05-01 DIAGNOSIS — N39.8 VOIDING DYSFUNCTION: ICD-10-CM

## 2025-05-01 DIAGNOSIS — R31.0 GROSS HEMATURIA: ICD-10-CM

## 2025-05-01 DIAGNOSIS — N31.2 FLACCID NEUROGENIC BLADDER: Primary | ICD-10-CM

## 2025-05-01 PROCEDURE — 1160F RVW MEDS BY RX/DR IN RCRD: CPT | Performed by: UROLOGY

## 2025-05-01 PROCEDURE — 99204 OFFICE O/P NEW MOD 45 MIN: CPT | Performed by: UROLOGY

## 2025-05-01 PROCEDURE — 81003 URINALYSIS AUTO W/O SCOPE: CPT | Performed by: UROLOGY

## 2025-05-01 PROCEDURE — 1159F MED LIST DOCD IN RCRD: CPT | Performed by: UROLOGY

## 2025-05-01 RX ORDER — NITROFURANTOIN 25; 75 MG/1; MG/1
100 CAPSULE ORAL 2 TIMES DAILY
Qty: 14 CAPSULE | Refills: 0 | Status: SHIPPED | OUTPATIENT
Start: 2025-05-01 | End: 2025-05-08

## 2025-05-01 RX ORDER — PHENAZOPYRIDINE HYDROCHLORIDE 200 MG/1
200 TABLET, FILM COATED ORAL
Qty: 21 TABLET | Refills: 0 | Status: SHIPPED | OUTPATIENT
Start: 2025-05-01 | End: 2025-05-08

## 2025-05-01 NOTE — LETTER
May 3, 2025     Chun Russell MD  [address]  [facility], [address]  [facility] [address]    Patient: Fadi North   YOB: 1945   Date of Visit: 5/1/2025       Dear Dr. Chun Russell MD:    Thank you for referring Fadi North to me for evaluation. Below are my notes for this consultation.  If you have questions, please do not hesitate to call me. I look forward to following your patient along with you.       Sincerely,     Ross Nicole MD      CC: No Recipients  ______________________________________________________________________________________    Subjective  Patient ID: Fadi North is a 79 y.o. male who presents for EVALUATION OF REC UTI'S SECONDARY TO DOING  ISC.  PT DOES ISC 4-5 X / DAY -- PT HAS A NEUROGENIC BLADDER. PT USUALLY HAS CIPRO ON HAND TO USE IF A UTI DEVELOPS.  NO H/O PROSTATE CANCER IN THE FAMILY. PT WAS NOT IN [facility].  PT  IS HAVING BURNING WHEN HE PASSES A CATHETER AND HAS OCC GROSS HEMATURIA.    ROS:  General-- No C/O fever or chills  Head-- No C/O Dizziness  Eyes-- NO  C/O blurry --PT HAS  double vision  Ears-- No C/O hearing loss  Neck-- Supple  Chest-- No C/O pain or discomfort  Lungs-- No C/O shortness of breath  Abdomen-- No C/O  pain or discomfort, No nausea or vomiting  Back-- PT  C/O back pain   Extremities-- No C/O swelling or pain    OBJECTIVE  General-- well-developed, well-nourished in NAD  Head-- normal cephalic, atraumatic  Eyes-- PERRL, EOM'S FROM, no jaundice  Neck-- Supple, without masses  Chest-- Normal bony structure  Abdomen-- soft, non tender, liver spleen not palpable. No suprapubic masses.  RECTUS DIASTASES, UMBILICAL HERNIA  Back-- no flank masses palpable, no CVA tenderness on palpation or percussion  Lymph nodes-- No inguinal lymphadenopathy noted  Prostate-- 2+, firm, smooth, non-tender, without nodules  Testis-- both down, non-tender, without masses  Epididymis-- no masses palpable  Scrotum -- no hydrocele  noted  Extremities -- Normal muscle mass and tone for the patients age  Neurological-- oriented times three    URINALYSIS DIPSTICK-- 2+ PROTEIN, LARGE HEMOGLOBINURIA, 3+ LEUKOCYTES -- COLOR IS RED-BROWN    4-28-25  URINE C & S -- NO GROWTH  ASSESSMENT / PLAN  A:  LONG H/O A MOTOR NEUROGENIC BLADDER  REMOTE H/O A BLADDER DIVERTICULECTOMY  REMOTE H/O BLADDER STONES   PERSISTENT HEMATURIA ? 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

## 2025-05-03 LAB — POC COLOR, URINE: ABNORMAL

## 2025-05-06 NOTE — PROGRESS NOTES
Patient ID: Fadi North is a 79 y.o. male WHO PRESENTS FOR A CYSTO TO MAKE SURE THE BLADDER LINING IS OK  BECAUSE  HE DOES CHRONIC ISC FOR A MOTOR NEUROGENIC BLADDER.  THESE PT'S ARE AT RISK OF DEVELOPING BLADDER CANCER BECAUSE OF A CHRONIC UTI.     Procedures    PROCEDURE:; CYSTOSCOPY   PRE OP:  H/O  A NEUROGENIC BLADDER  POST OP -- NORMAL CYSTOSCOPY  SURGEON -- ALBA ROSADO  ANES:  1 % LIDOCAINE  FINDINGS:  After informed consent was obtained, the patient was taken to the procedure room for cystoscopy due to A H/O A NEUROGENIC BLADDER AND  A CHRONIC UTI TO MAKE SURE HE HAS DEVELOPED A BLADDER CANCER .     Cystoscopy     Procedure Note:    A sterile prep and drape was performed in standard fashion. Lidocaine was used for topical anesthesia. A flexible cystoscope was inserted into the urethra without difficulty revealing normal urethra.     The prostate MILDLY OBSTRUCTING     Then entered the bladder revealing bladder mucosa with no erythematous patches or plaques, foreign bodies, stones or papillary lesions. The ureteral orifices were visualized bilaterally. These were orthotopic in location and effluxing clear urine. No masses were seen on retroflexion.  1+TRABECULATION. FEW SMALL DIVERTICULA    Post-Procedure:   The cystoscope was removed. The vital signs were stable . The patient tolerated the procedure well. There were no complications.       ASSESSMENT / PLAN  A:  LONG H/O A MOTOR NEUROGENIC BLADDER  REMOTE H/O A BLADDER DIVERTICULECTOMY  REMOTE H/O BLADDER STONES   PERSISTENT HEMATURIA ? 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 --PT WOULD LIKE TO HAVE A CYSTO DONE TO MAKE SURE THE BLADDER MUCOSA IS OK -- CYSTO ON 5-8-25 -- WNL     [facility] H/O ALL DIFFERENT TYPES OF CANCER    PT HAS BILATERAL HIP REPLACEMENTS  P:  REASSURANCE, EDUCATION, SUPPORTIVE CARE RENDERED TO THE PT  PYRIDIUM 200 MG TID TO HELP REDUCE HIS BURNING  WHEN HE DOES ISC  MACROBID 100 MG  # 14 ONE BID PRN IF A UTI OCCURS-- THIS IS BEING DONE BECAUSE OF HIS H/O BILATERAL HIP REPLACEMENTS   PT TO TRY D-MANNOS WITH CRANBERRY ONE BID T HEP PREVENT THE UTI'S FROM REOCCURRING   F/U IN ONE YEAR OR SOONER IF PROBLEMS OCCUR  Ross Nicole MD 05/06/25 9:41 AM

## 2025-05-08 ENCOUNTER — APPOINTMENT (OUTPATIENT)
Dept: UROLOGY | Facility: CLINIC | Age: 80
End: 2025-05-08
Payer: COMMERCIAL

## 2025-05-08 VITALS
HEIGHT: 66 IN | DIASTOLIC BLOOD PRESSURE: 89 MMHG | BODY MASS INDEX: 26.03 KG/M2 | WEIGHT: 162 LBS | HEART RATE: 80 BPM | SYSTOLIC BLOOD PRESSURE: 165 MMHG

## 2025-05-08 DIAGNOSIS — N39.8 VOIDING DYSFUNCTION: ICD-10-CM

## 2025-05-08 DIAGNOSIS — R31.9 HEMATURIA, UNSPECIFIED TYPE: ICD-10-CM

## 2025-05-08 DIAGNOSIS — N39.0 RECURRENT UTI: Primary | ICD-10-CM

## 2025-05-08 DIAGNOSIS — N31.2 FLACCID NEUROGENIC BLADDER: ICD-10-CM

## 2025-05-08 RX ORDER — LIDOCAINE HYDROCHLORIDE 20 MG/ML
1 JELLY TOPICAL ONCE
Status: COMPLETED | OUTPATIENT
Start: 2025-05-08 | End: 2025-05-08

## 2025-05-08 RX ORDER — NITROFURANTOIN 25; 75 MG/1; MG/1
100 CAPSULE ORAL 2 TIMES DAILY
Qty: 14 CAPSULE | Refills: 6 | Status: SHIPPED | OUTPATIENT
Start: 2025-05-08 | End: 2025-06-26

## 2025-05-08 RX ADMIN — LIDOCAINE HYDROCHLORIDE 1 APPLICATION: 20 JELLY TOPICAL at 10:04

## 2025-05-08 NOTE — LETTER
May 8, 2025     Chun Russell MD  [address]  [facility], [address]  [facility] [address]    Patient: Fadi North   YOB: 1945   Date of Visit: 5/8/2025       Dear Dr. Chun Russell MD:    Thank you for referring Fadi North to me for evaluation. Below are my notes for this consultation.  If you have questions, please do not hesitate to call me. I look forward to following your patient along with you.       Sincerely,     Ross Nicole MD      CC: No Recipients  ______________________________________________________________________________________    Patient ID: Fadi North is a 79 y.o. male WHO PRESENTS FOR A CYSTO TO MAKE SURE THE BLADDER LINING IS OK  BECAUSE  HE DOES CHRONIC ISC FOR A MOTOR NEUROGENIC BLADDER.  THESE PT'S ARE AT RISK OF DEVELOPING BLADDER CANCER BECAUSE OF A CHRONIC UTI.     Procedures    PROCEDURE:; CYSTOSCOPY   PRE OP:  H/O  A NEUROGENIC BLADDER  POST OP -- NORMAL CYSTOSCOPY  SURGEON -- ALBA ROSADO  ANES:  1 % LIDOCAINE  FINDINGS:  After informed consent was obtained, the patient was taken to the procedure room for cystoscopy due to A H/O A NEUROGENIC BLADDER AND  A CHRONIC UTI TO MAKE SURE HE HAS DEVELOPED A BLADDER CANCER .     Cystoscopy     Procedure Note:    A sterile prep and drape was performed in standard fashion. Lidocaine was used for topical anesthesia. A flexible cystoscope was inserted into the urethra without difficulty revealing normal urethra.     The prostate MILDLY OBSTRUCTING     Then entered the bladder revealing bladder mucosa with no erythematous patches or plaques, foreign bodies, stones or papillary lesions. The ureteral orifices were visualized bilaterally. These were orthotopic in location and effluxing clear urine. No masses were seen on retroflexion.  1+TRABECULATION. FEW SMALL DIVERTICULA    Post-Procedure:   The cystoscope was removed. The vital signs were stable . The patient tolerated the procedure well. There were no  complications.       ASSESSMENT / PLAN  A:  LONG H/O A MOTOR NEUROGENIC BLADDER  REMOTE H/O A BLADDER DIVERTICULECTOMY  REMOTE H/O BLADDER STONES   PERSISTENT HEMATURIA ? 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 --PT WOULD LIKE TO HAVE A CYSTO DONE TO MAKE SURE THE BLADDER MUCOSA IS OK -- CYSTO ON 5-8-25 -- WNL     [facility] H/O ALL DIFFERENT TYPES OF CANCER    PT HAS BILATERAL HIP REPLACEMENTS  P:  REASSURANCE, EDUCATION, SUPPORTIVE CARE RENDERED TO THE PT  PYRIDIUM 200 MG TID TO HELP REDUCE HIS BURNING WHEN HE DOES ISC  MACROBID 100 MG  # 14 ONE BID PRN IF A UTI OCCURS-- THIS IS BEING DONE BECAUSE OF HIS H/O BILATERAL HIP REPLACEMENTS   PT TO TRY D-MANNOS WITH CRANBERRY ONE BID T HEP PREVENT THE UTI'S FROM REOCCURRING   F/U IN ONE YEAR OR SOONER IF PROBLEMS OCCUR  Ross Nicole MD 05/06/25 9:41 AM

## 2025-05-12 ENCOUNTER — OFFICE VISIT (OUTPATIENT)
Dept: URGENT CARE | Age: 80
End: 2025-05-12
Payer: COMMERCIAL

## 2025-05-12 VITALS
DIASTOLIC BLOOD PRESSURE: 95 MMHG | RESPIRATION RATE: 16 BRPM | TEMPERATURE: 98 F | OXYGEN SATURATION: 97 % | BODY MASS INDEX: 27.34 KG/M2 | HEART RATE: 74 BPM | SYSTOLIC BLOOD PRESSURE: 160 MMHG | WEIGHT: 169.4 LBS

## 2025-05-12 DIAGNOSIS — N30.01 ACUTE CYSTITIS WITH HEMATURIA: ICD-10-CM

## 2025-05-12 DIAGNOSIS — R30.0 DYSURIA: Primary | ICD-10-CM

## 2025-05-12 LAB
POC APPEARANCE, URINE: ABNORMAL
POC BILIRUBIN, URINE: NEGATIVE
POC BLOOD, URINE: ABNORMAL
POC COLOR, URINE: YELLOW
POC GLUCOSE, URINE: NEGATIVE MG/DL
POC KETONES, URINE: NEGATIVE MG/DL
POC LEUKOCYTES, URINE: ABNORMAL
POC NITRITE,URINE: NEGATIVE
POC PH, URINE: 6 PH
POC PROTEIN, URINE: ABNORMAL MG/DL
POC SPECIFIC GRAVITY, URINE: 1.01
POC UROBILINOGEN, URINE: 0.2 EU/DL

## 2025-05-12 PROCEDURE — 1160F RVW MEDS BY RX/DR IN RCRD: CPT

## 2025-05-12 PROCEDURE — 1159F MED LIST DOCD IN RCRD: CPT

## 2025-05-12 PROCEDURE — 1125F AMNT PAIN NOTED PAIN PRSNT: CPT

## 2025-05-12 PROCEDURE — 81003 URINALYSIS AUTO W/O SCOPE: CPT

## 2025-05-12 PROCEDURE — 99214 OFFICE O/P EST MOD 30 MIN: CPT

## 2025-05-12 PROCEDURE — 1036F TOBACCO NON-USER: CPT

## 2025-05-12 RX ORDER — LEVOFLOXACIN 750 MG/1
750 TABLET, FILM COATED ORAL EVERY 24 HOURS
Qty: 7 TABLET | Refills: 0 | Status: SHIPPED | OUTPATIENT
Start: 2025-05-12 | End: 2025-05-19

## 2025-05-12 ASSESSMENT — PAIN SCALES - GENERAL: PAINLEVEL_OUTOF10: 3

## 2025-05-12 ASSESSMENT — PATIENT HEALTH QUESTIONNAIRE - PHQ9
1. LITTLE INTEREST OR PLEASURE IN DOING THINGS: NOT AT ALL
2. FEELING DOWN, DEPRESSED OR HOPELESS: NOT AT ALL
SUM OF ALL RESPONSES TO PHQ9 QUESTIONS 1 AND 2: 0

## 2025-05-12 ASSESSMENT — ENCOUNTER SYMPTOMS: DYSURIA: 1

## 2025-05-12 NOTE — PROGRESS NOTES
Subjective   Patient ID: Fadi North is a 79 y.o. male. They present today with a chief complaint of Urinary Problem (X 4-5 days states recurrent).    History of Present Illness  HPI    79-year-old male presents with concerns of dysuria. He endorses that he was in the clinic about 9 days ago for the same issue- he was given cephalexin, but did not take it due to his penicillin allergy. He then called his urologist who ordered him macrobid; he completed this treatment and felt as though the UTI came right back after stopping the macrobid. He states that he sees his urologist regularly and he straight catheterizes himself- he has been doing this for the last 15 years. He is here for evaluation.     Past Medical History  Allergies as of 05/12/2025 - Reviewed 05/12/2025   Allergen Reaction Noted    Penicillins Unknown 07/18/2018       Prescriptions Prior to Admission[1]     Medical History[2]    Surgical History[3]     reports that he has never smoked. He has never been exposed to tobacco smoke. He has never used smokeless tobacco. He reports current alcohol use of about 2.0 standard drinks of alcohol per week. He reports that he does not use drugs.    Review of Systems  Review of Systems   Genitourinary:  Positive for dysuria.                                  Objective    Vitals:    05/12/25 0815   BP: (!) 160/95   Pulse: 74   Resp: 16   Temp: 36.7 °C (98 °F)   TempSrc: Oral   SpO2: 97%   Weight: 76.8 kg (169 lb 6.4 oz)     No LMP for male patient.    Physical Exam  Cardiovascular:      Rate and Rhythm: Normal rate and regular rhythm.      Pulses: Normal pulses.      Heart sounds: Normal heart sounds.   Pulmonary:      Effort: Pulmonary effort is normal.      Breath sounds: Normal breath sounds.         Procedures    Point of Care Test & Imaging Results from this visit  Results for orders placed or performed in visit on 05/12/25   POCT UA Automated manually resulted   Result Value Ref Range    POC Color, Urine  Yellow Straw, Yellow, Light-Yellow    POC Appearance, Urine Cloudy (A) Clear    POC Glucose, Urine NEGATIVE NEGATIVE mg/dl    POC Bilirubin, Urine NEGATIVE NEGATIVE    POC Ketones, Urine NEGATIVE NEGATIVE mg/dl    POC Specific Gravity, Urine 1.015 1.005 - 1.035    POC Blood, Urine SMALL (1+) (A) NEGATIVE    POC PH, Urine 6.0 No Reference Range Established PH    POC Protein, Urine 15 (1+) (A) NEGATIVE mg/dl    POC Urobilinogen, Urine 0.2 0.2, 1.0 EU/DL    Poc Nitrite, Urine NEGATIVE NEGATIVE    POC Leukocytes, Urine SMALL (1+) (A) NEGATIVE      Imaging  No results found.    Cardiology, Vascular, and Other Imaging  No other imaging results found for the past 2 days      Diagnostic study results (if any) were reviewed by [facility].    Assessment/Plan   Allergies, medications, history, and pertinent labs/EKGs/Imaging reviewed by [facility].     Medical Decision Making  UA positive for leukocytes, protein, and blood; we discussed that a urine culture will be sent out and he will be contacted with any necessary changes. He was agreeable to a prescription for levofloxacin, as he normally take ciprofloxacin for his UTIs, which does provide relief. I recommend following up with urology and PCP. As a result of the work-up, the patient was discharged home.  he was informed of his diagnosis and instructed to come back with any concerns or worsening of condition.  he and was agreeable to the plan as discussed above.  he was given the opportunity to ask questions.  All of the patient's questions were answered.    Orders and Diagnoses  Diagnoses and all orders for this visit:  Dysuria  -     POCT UA Automated manually resulted  -     Urine Culture  Acute cystitis with hematuria  -     levoFLOXacin (Levaquin) 750 mg tablet; Take 1 tablet (750 mg) by mouth once every 24 hours for 7 days.      Medical Admin Record      Patient disposition: Home    Electronically signed by Briseida DELUCA  XAVIER Camacho-CNP  8:33 AM           [1] (Not in a hospital admission)   [2]   Past Medical History:  Diagnosis Date    Acute bacterial sinusitis 11/16/2023    Acute frontal sinusitis, unspecified 01/08/2018    Acute frontal sinusitis    Acute serous otitis media, bilateral 08/07/2018    Bilateral acute serous otitis media, recurrence not specified    Acute upper respiratory infection, unspecified 01/11/2019    Acute upper respiratory infection    Candidiasis of skin and nail 12/03/2018    Candidal dermatitis    Chest discomfort 11/20/2024    Contact dermatitis 11/16/2023    Dermatitis, 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    Dermatitis, eczematoid    Disorder of the skin and subcutaneous tissue, unspecified 12/31/2018    Skin lesion    Fracture of unspecified phalanx of unspecified finger, initial encounter for closed fracture 03/26/2013    Closed fracture of one or more phalanges of hand    Gastro-esophageal reflux disease without esophagitis 06/30/2013    Esophageal reflux    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    Glossodynia    Headache, 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    Headache    Headache, unspecified 01/15/2018    Frontal headache    Hypercholesteremia 11/20/2024    Malignant neoplasm of tongue, 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    More than 50 percent stenosis of right internal carotid artery 11/20/2024    Nonspecific lymphadenitis, 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    Lymphadenitis    Other conditions influencing health status 03/26/2013    Foot pain, unspecified laterality    Other conditions influencing health status 01/11/2019    History of cough    Other conditions influencing health status 06/30/2013    Anomalies Of The Tongue    Other diseases of stomach and duodenum 04/15/2021    Submucosal lesion of stomach    Other specified hypothyroidism 03/26/2013    Secondary hypothyroidism    Pain in throat 06/02/2016    Throat pain    Palpitations 11/20/2024    Personal history of diseases of the skin and subcutaneous tissue  06/11/2018    History of actinic keratosis    Personal history of diseases of the skin and subcutaneous tissue 12/17/2018    History of seborrheic keratosis    Personal history of other diseases of the respiratory system 08/07/2018    History of sore throat    Personal history of other specified conditions 06/11/2018    History of polyuria    PONV (postoperative nausea and vomiting)     Primary osteoarthritis, unspecified hand 03/26/2013    Osteoarthritis, hand    Tongue pain 11/16/2023    Urinary tract infection, site not specified 03/26/2013    Urinary tract infection   [3]   Past Surgical History:  Procedure Laterality Date    BLADDER SURGERY  09/03/2015    Bladder Surgery    OTHER SURGICAL HISTORY  09/03/2015    Biopsy Tongue    TOTAL HIP ARTHROPLASTY      right one was 2021 left was done 2022

## 2025-05-14 LAB — BACTERIA UR CULT: NORMAL

## 2025-05-15 ENCOUNTER — OFFICE VISIT (OUTPATIENT)
Dept: UROLOGY | Facility: CLINIC | Age: 80
End: 2025-05-15
Payer: COMMERCIAL

## 2025-05-15 VITALS
DIASTOLIC BLOOD PRESSURE: 76 MMHG | HEIGHT: 66 IN | SYSTOLIC BLOOD PRESSURE: 149 MMHG | HEART RATE: 81 BPM | BODY MASS INDEX: 26.36 KG/M2 | WEIGHT: 164 LBS

## 2025-05-15 DIAGNOSIS — R32 URINARY INCONTINENCE, UNSPECIFIED TYPE: ICD-10-CM

## 2025-05-15 DIAGNOSIS — R31.9 HEMATURIA, UNSPECIFIED TYPE: ICD-10-CM

## 2025-05-15 PROCEDURE — 1036F TOBACCO NON-USER: CPT | Performed by: UROLOGY

## 2025-05-15 PROCEDURE — 1160F RVW MEDS BY RX/DR IN RCRD: CPT | Performed by: UROLOGY

## 2025-05-15 PROCEDURE — 99212 OFFICE O/P EST SF 10 MIN: CPT | Performed by: UROLOGY

## 2025-05-15 PROCEDURE — 1159F MED LIST DOCD IN RCRD: CPT | Performed by: UROLOGY

## 2025-05-15 RX ORDER — TRIMETHOPRIM 100 MG/1
100 TABLET ORAL NIGHTLY
Qty: 90 TABLET | Refills: 0 | Status: SHIPPED | OUTPATIENT
Start: 2025-05-15 | End: 2025-08-13

## 2025-05-15 NOTE — PROGRESS NOTES
Subjective   Patient ID: Fadi North is a 79 y.o. male who presents for UTI (Patient is here today FOR FOLLOW UP ON RECURRENT UTI AFTER HIS CYSTO.  HE IS GOING OUT OF TOWN AND WANTS TO GET A PROPHYLACTIC ATB.). PT SAYS HE DEVELOPED A UTI AFTER THE CYSTO THAT WAS DONE ON 5-8-25 AND IS CONCERNED BECAUSE HE IS GOING OUT OF TOWN IN A WEEK AND DOES NOT WANT TO DEVELOP A UTI  OUT OF TOWN  PT DOES ISC 5 X / DAY    ASSESSMENT / PLAN  A:  LONG H/O A MOTOR NEUROGENIC BLADDER  REMOTE H/O A BLADDER DIVERTICULECTOMY  REMOTE H/O BLADDER STONES   PERSISTENT HEMATURIA ? 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

## 2025-05-15 NOTE — LETTER
May 17, 2025     Chun Russell MD  [address]  [facility], [address]  [facility] [address]    Patient: Fadi North   YOB: 1945   Date of Visit: 5/15/2025       Dear Dr. Chun Russell MD:    Thank you for referring Fadi North to me for evaluation. Below are my notes for this consultation.  If you have questions, please do not hesitate to call me. I look forward to following your patient along with you.       Sincerely,     Ross Nicole MD      CC: No Recipients  ______________________________________________________________________________________    Subjective  Patient ID: Fadi North is a 79 y.o. male who presents for UTI (Patient is here today FOR FOLLOW UP ON RECURRENT UTI AFTER HIS CYSTO.  HE IS GOING OUT OF TOWN AND WANTS TO GET A PROPHYLACTIC ATB.). PT SAYS HE DEVELOPED A UTI AFTER THE CYSTO THAT WAS DONE ON 5-8-25 AND IS CONCERNED BECAUSE HE IS GOING OUT OF TOWN IN A WEEK AND DOES NOT WANT TO DEVELOP A UTI  OUT OF TOWN  PT DOES ISC 5 X / DAY    ASSESSMENT / PLAN  A:  LONG H/O A MOTOR NEUROGENIC BLADDER  REMOTE H/O A BLADDER DIVERTICULECTOMY  REMOTE H/O BLADDER STONES   PERSISTENT HEMATURIA ? 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

## 2025-06-12 DIAGNOSIS — K21.9 GASTROESOPHAGEAL REFLUX DISEASE WITHOUT ESOPHAGITIS: ICD-10-CM

## 2025-06-16 RX ORDER — OMEPRAZOLE 40 MG/1
40 CAPSULE, DELAYED RELEASE ORAL DAILY
Qty: 90 CAPSULE | Refills: 1 | Status: SHIPPED | OUTPATIENT
Start: 2025-06-16

## 2025-08-12 DIAGNOSIS — R31.9 HEMATURIA, UNSPECIFIED TYPE: ICD-10-CM

## 2025-08-12 RX ORDER — TRIMETHOPRIM 100 MG/1
100 TABLET ORAL NIGHTLY
Qty: 90 TABLET | Refills: 0 | Status: SHIPPED | OUTPATIENT
Start: 2025-08-12 | End: 2025-11-10

## 2025-08-27 ENCOUNTER — APPOINTMENT (OUTPATIENT)
Dept: PRIMARY CARE | Facility: CLINIC | Age: 80
End: 2025-08-27
Payer: COMMERCIAL

## 2025-09-08 ENCOUNTER — APPOINTMENT (OUTPATIENT)
Dept: VASCULAR SURGERY | Facility: HOSPITAL | Age: 80
End: 2025-09-08
Payer: COMMERCIAL

## 2025-09-08 ENCOUNTER — APPOINTMENT (OUTPATIENT)
Dept: VASCULAR MEDICINE | Facility: HOSPITAL | Age: 80
End: 2025-09-08
Payer: COMMERCIAL

## 2026-05-07 ENCOUNTER — APPOINTMENT (OUTPATIENT)
Dept: UROLOGY | Facility: CLINIC | Age: 81
End: 2026-05-07
Payer: COMMERCIAL

## (undated) DEVICE — COVER, CART, 45 X 27 X 48 IN, CLEAR

## (undated) DEVICE — CONTAINER, SPECIMEN, 120 ML, STERILE

## (undated) DEVICE — INSERT, CLAMP, SURGICAL, SOFT/TRACTION, STEALTH, 1 MM

## (undated) DEVICE — WOUND SYSTEM, DEBRIDEMENT & CLEANING, O.R DUOPAK

## (undated) DEVICE — Device

## (undated) DEVICE — SUTURE, MONOCRYL, 4-0, 18 IN, PS2, UNDYED

## (undated) DEVICE — SUTURE, VICRYL, 2-0, 36 IN, CT-1, UNDYED

## (undated) DEVICE — SUTURE, PROLENE, 6-0, 30 IN, BV-1 BV-1

## (undated) DEVICE — NEEDLE, ELECTRODE, SUBDERMAL, PAIRED, 2.0 LEAD, DISP

## (undated) DEVICE — TAPE, UMBILICAL, 1/8 X 30 IN, MULTIPACK, COTTON, WHITE

## (undated) DEVICE — MANIFOLD, 4 PORT NEPTUNE STANDARD

## (undated) DEVICE — DRAPE, SHEET, THYROID, W/ARMBOARD COVER, 100 X 121 IN, DISPOSABLE, LF, STERILE

## (undated) DEVICE — DRAPE, MAGENTIC INSTRUMENT, 12X16

## (undated) DEVICE — DRAPE, INCISE, ANTIMICROBIAL, IOBAN 2, LARGE, 17 X 23 IN, DISPOSABLE, STERILE

## (undated) DEVICE — APPLICATOR, PREP, CHLORAPREP, W/ORANGE TINT, 10.5ML

## (undated) DEVICE — SUTURE, VICRYL, 3-0, 27 IN, SH

## (undated) DEVICE — SUTURE, PROLENE, 7-0, 30 IN, BV1, DA, BLUE

## (undated) DEVICE — ADHESIVE, SKIN, DERMABOND ADVANCED, 15CM, PEN-STYLE

## (undated) DEVICE — ELECTRODE, CORKSCREW NEEDLE 1.5M LENGTH

## (undated) DEVICE — SPONGE, HEMOSTATIC, GELATIN, SURGIFOAM, 8 X 12.5 CM X 10 MM

## (undated) DEVICE — KIT, TOURNIQUET, 7"

## (undated) DEVICE — COVER, TABLE, UHC

## (undated) DEVICE — ELECTRODE, GROUND PLATE

## (undated) DEVICE — SEALANT, HEMOSTATIC, FLOSEAL, 10 ML

## (undated) DEVICE — PROTECTOR, NERVE, ULNAR, PINK

## (undated) DEVICE — COVER, TABLE, 44 X 75 IN, DISPOSABLE, LF, STERILE

## (undated) DEVICE — GLOVE, SURGICAL, PROTEXIS PI MICRO, 7.0, PF, LF

## (undated) DEVICE — SUTURE, SILK, 2-0, 30 IN, SH, BLACK

## (undated) DEVICE — SUTURE, PROLENE, 5-0, 36 IN, C-1, CV-11, BLUE